# Patient Record
Sex: MALE | Race: BLACK OR AFRICAN AMERICAN | NOT HISPANIC OR LATINO
[De-identification: names, ages, dates, MRNs, and addresses within clinical notes are randomized per-mention and may not be internally consistent; named-entity substitution may affect disease eponyms.]

---

## 2017-01-19 PROBLEM — Z00.00 ENCOUNTER FOR PREVENTIVE HEALTH EXAMINATION: Status: ACTIVE | Noted: 2017-01-19

## 2017-01-20 ENCOUNTER — APPOINTMENT (OUTPATIENT)
Dept: OTOLARYNGOLOGY | Facility: CLINIC | Age: 22
End: 2017-01-20

## 2017-01-20 VITALS
SYSTOLIC BLOOD PRESSURE: 116 MMHG | DIASTOLIC BLOOD PRESSURE: 65 MMHG | OXYGEN SATURATION: 16 % | WEIGHT: 155 LBS | BODY MASS INDEX: 20.99 KG/M2 | HEART RATE: 100 BPM | HEIGHT: 72 IN

## 2017-01-20 DIAGNOSIS — Z83.3 FAMILY HISTORY OF DIABETES MELLITUS: ICD-10-CM

## 2017-01-20 DIAGNOSIS — Z80.3 FAMILY HISTORY OF MALIGNANT NEOPLASM OF BREAST: ICD-10-CM

## 2017-01-20 DIAGNOSIS — Z82.49 FAMILY HISTORY OF ISCHEMIC HEART DISEASE AND OTHER DISEASES OF THE CIRCULATORY SYSTEM: ICD-10-CM

## 2017-01-20 DIAGNOSIS — Z80.9 FAMILY HISTORY OF MALIGNANT NEOPLASM, UNSPECIFIED: ICD-10-CM

## 2017-01-20 NOTE — PROCEDURE
[Topical Lidocaine] : topical lidocaine [Oxymetazoline HCl] : oxymetazoline HCl [Flexible Endoscope] : examined with the flexible endoscope [Serial Number: ___] : Serial Number: [unfilled] [Mass ___ cm] : [unfilled]Ucm mass [Normal] : the false vocal folds were pink and regular, the ventricular sulcus was open, the true vocal folds were glistening white, tense and of equal length, mobility, and height [de-identified] : there is enlarges adenoid w some overlying irritation [de-identified] : adenopathy [FreeTextEntry3] : enlarged adenoid w overlying irregularity

## 2017-01-20 NOTE — HISTORY OF PRESENT ILLNESS
[Neck Mass] : neck mass [de-identified] : Patient noted a lump on his neck about 2-3 months.  He went to [facility] [place] of which they did a sonogram and noted he had lyphadenopathy.  Her PCP referred patient to a Hematologist who referred him to Dr. Wesley.  Patient had a sonogram in Dr. Wesley's office and was told that he may have lymphoma.  Patient was referred to us.  No biopsies were performed.  first noted bu pt sever mo ago. no fecver, nt sweats or wt loss. Pt had CT at [facility]  [Difficulty Swallowing] : no difficulty swallowing [Painful Swallowing] : no painful swallowing

## 2017-01-20 NOTE — PHYSICAL EXAM
[Nodule] : nodule [Midline] : trachea located in midline position [Normal] : no rashes [FreeTextEntry1] : pt w bilat mobile post trianle, upper jug and submax nodes

## 2017-01-20 NOTE — CONSULT LETTER
[Dear  ___] : Dear  [unfilled], [Consult Letter:] : I had the pleasure of evaluating your patient, [unfilled]. [Please see my note below.] : Please see my note below. [Consult Closing:] : Thank you very much for allowing me to participate in the care of this patient.  If you have any questions, please do not hesitate to contact me. [Sincerely,] : Sincerely, [Jose Reis MD] : Jose Reis MD [FreeTextEntry2] : Art Wesley MD ([place], [place])

## 2017-01-25 ENCOUNTER — FORM ENCOUNTER (OUTPATIENT)
Age: 22
End: 2017-01-25

## 2017-01-25 ENCOUNTER — RESULT REVIEW (OUTPATIENT)
Age: 22
End: 2017-01-25

## 2017-01-26 ENCOUNTER — OUTPATIENT (OUTPATIENT)
Dept: OUTPATIENT SERVICES | Facility: HOSPITAL | Age: 22
LOS: 1 days | End: 2017-01-26
Payer: COMMERCIAL

## 2017-01-26 ENCOUNTER — APPOINTMENT (OUTPATIENT)
Dept: ULTRASOUND IMAGING | Facility: IMAGING CENTER | Age: 22
End: 2017-01-26

## 2017-01-26 DIAGNOSIS — R59.0 LOCALIZED ENLARGED LYMPH NODES: ICD-10-CM

## 2017-01-26 PROCEDURE — 20206 BIOPSY MUSCLE PERQ NEEDLE: CPT

## 2017-01-26 PROCEDURE — 88185 FLOWCYTOMETRY/TC ADD-ON: CPT

## 2017-01-26 PROCEDURE — 88184 FLOWCYTOMETRY/ TC 1 MARKER: CPT

## 2017-01-26 PROCEDURE — 88342 IMHCHEM/IMCYTCHM 1ST ANTB: CPT

## 2017-01-26 PROCEDURE — 76942 ECHO GUIDE FOR BIOPSY: CPT

## 2017-01-26 PROCEDURE — 88360 TUMOR IMMUNOHISTOCHEM/MANUAL: CPT

## 2017-01-26 PROCEDURE — 88365 INSITU HYBRIDIZATION (FISH): CPT

## 2017-01-26 PROCEDURE — 88341 IMHCHEM/IMCYTCHM EA ADD ANTB: CPT

## 2017-01-26 PROCEDURE — 88172 CYTP DX EVAL FNA 1ST EA SITE: CPT

## 2017-01-26 PROCEDURE — 88173 CYTOPATH EVAL FNA REPORT: CPT

## 2017-01-26 PROCEDURE — 88305 TISSUE EXAM BY PATHOLOGIST: CPT

## 2017-02-02 LAB — TM INTERPRETATION: SIGNIFICANT CHANGE UP

## 2017-02-03 ENCOUNTER — OUTPATIENT (OUTPATIENT)
Dept: OUTPATIENT SERVICES | Facility: HOSPITAL | Age: 22
LOS: 1 days | End: 2017-02-03

## 2017-02-03 VITALS
WEIGHT: 158.95 LBS | DIASTOLIC BLOOD PRESSURE: 74 MMHG | HEIGHT: 71.5 IN | RESPIRATION RATE: 16 BRPM | TEMPERATURE: 97 F | HEART RATE: 68 BPM | SYSTOLIC BLOOD PRESSURE: 126 MMHG

## 2017-02-03 DIAGNOSIS — Z98.890 OTHER SPECIFIED POSTPROCEDURAL STATES: Chronic | ICD-10-CM

## 2017-02-03 DIAGNOSIS — R59.0 LOCALIZED ENLARGED LYMPH NODES: ICD-10-CM

## 2017-02-03 LAB
HCT VFR BLD CALC: 34.6 % — LOW (ref 39–50)
HGB BLD-MCNC: 11.2 G/DL — LOW (ref 13–17)
MCHC RBC-ENTMCNC: 27.9 PG — SIGNIFICANT CHANGE UP (ref 27–34)
MCHC RBC-ENTMCNC: 32.4 % — SIGNIFICANT CHANGE UP (ref 32–36)
MCV RBC AUTO: 86.3 FL — SIGNIFICANT CHANGE UP (ref 80–100)
PLATELET # BLD AUTO: 218 K/UL — SIGNIFICANT CHANGE UP (ref 150–400)
PMV BLD: 10.5 FL — SIGNIFICANT CHANGE UP (ref 7–13)
RBC # BLD: 4.01 M/UL — LOW (ref 4.2–5.8)
RBC # FLD: 14 % — SIGNIFICANT CHANGE UP (ref 10.3–14.5)
WBC # BLD: 7.79 K/UL — SIGNIFICANT CHANGE UP (ref 3.8–10.5)
WBC # FLD AUTO: 7.79 K/UL — SIGNIFICANT CHANGE UP (ref 3.8–10.5)

## 2017-02-03 RX ORDER — SODIUM CHLORIDE 9 MG/ML
1000 INJECTION, SOLUTION INTRAVENOUS
Qty: 0 | Refills: 0 | Status: DISCONTINUED | OUTPATIENT
Start: 2017-02-08 | End: 2017-02-23

## 2017-02-03 RX ORDER — SODIUM CHLORIDE 9 MG/ML
3 INJECTION INTRAMUSCULAR; INTRAVENOUS; SUBCUTANEOUS EVERY 8 HOURS
Qty: 0 | Refills: 0 | Status: DISCONTINUED | OUTPATIENT
Start: 2017-02-08 | End: 2017-02-23

## 2017-02-03 NOTE — H&P PST ADULT - NEGATIVE OPHTHALMOLOGIC SYMPTOMS
no lacrimation R/no discharge R/no blurred vision R/no loss of vision L/no pain R/no pain L/no photophobia/no blurred vision L/no diplopia/no scleral injection L/no irritation R/no irritation L/no lacrimation L/no discharge L/no loss of vision R

## 2017-02-03 NOTE — H&P PST ADULT - RS GEN PE MLT RESP DETAILS PC
breath sounds equal/no intercostal retractions/respirations non-labored/good air movement/no rales/no chest wall tenderness/airway patent/no subcutaneous emphysema/clear to auscultation bilaterally/no rhonchi/no wheezes

## 2017-02-03 NOTE — H&P PST ADULT - PROBLEM SELECTOR PLAN 1
Scheduled for bilateral lymph node biopsy on 02/08/17. Pre op instructions, famotidine, chlorhexidine gluconate soap given and explained. Pt verbalized understanding.

## 2017-02-03 NOTE — H&P PST ADULT - NECK DETAILS
no JVD/palpable enlarged mass on both sides of neck/supple/normal thyroid gland palpable enlarged masses on both sides of neck/no JVD/supple/normal thyroid gland

## 2017-02-03 NOTE — H&P PST ADULT - NSANTHOSAYNRD_GEN_A_CORE
No. SAHARA screening performed.  STOP BANG Legend: 0-2 = LOW Risk; 3-4 = INTERMEDIATE Risk; 5-8 = HIGH Risk

## 2017-02-03 NOTE — H&P PST ADULT - HISTORY OF PRESENT ILLNESS
x3-4 months 22 y/o Black male presents to PST for pre op evaluation with hx of multiple neck masses x3-4 months. Pt is now scheduled for bilateral lymph node biopsy on 02/08/17

## 2017-02-03 NOTE — H&P PST ADULT - NEGATIVE ENMT SYMPTOMS
no vertigo/no nasal discharge/no nasal obstruction/no sinus symptoms/no post-nasal discharge/no throat pain/no gum bleeding/no recurrent cold sores/no dry mouth/no dysphagia/no nose bleeds/no ear pain/no nasal congestion/no abnormal taste sensation no hearing difficulty/no throat pain/no sinus symptoms/no dysphagia/no dry mouth/no recurrent cold sores/no post-nasal discharge/no nose bleeds/no abnormal taste sensation/no ear pain/no nasal congestion/no vertigo/no nasal obstruction/no nasal discharge/no gum bleeding

## 2017-02-03 NOTE — H&P PST ADULT - NEGATIVE CARDIOVASCULAR SYMPTOMS
no chest pain/no claudication/no paroxysmal nocturnal dyspnea/no palpitations/no orthopnea/no peripheral edema/no dyspnea on exertion

## 2017-02-03 NOTE — H&P PST ADULT - NEGATIVE NEUROLOGICAL SYMPTOMS
no loss of sensation/no difficulty walking/no tremors/no vertigo/no paresthesias/no syncope/no generalized seizures/no focal seizures/no transient paralysis/no weakness

## 2017-02-07 ENCOUNTER — RESULT REVIEW (OUTPATIENT)
Age: 22
End: 2017-02-07

## 2017-02-07 NOTE — ASU PATIENT PROFILE, ADULT - TEACHING/LEARNING LEARNING PREFERENCES
skill demonstration/pictorial/video/verbal instruction/group instruction/audio/individual instruction/computer/internet/written material

## 2017-02-08 ENCOUNTER — OUTPATIENT (OUTPATIENT)
Dept: OUTPATIENT SERVICES | Facility: HOSPITAL | Age: 22
LOS: 1 days | End: 2017-02-08
Payer: COMMERCIAL

## 2017-02-08 ENCOUNTER — OUTPATIENT (OUTPATIENT)
Dept: OUTPATIENT SERVICES | Facility: HOSPITAL | Age: 22
LOS: 1 days | Discharge: HOME | End: 2017-02-08
Payer: MEDICAID

## 2017-02-08 ENCOUNTER — APPOINTMENT (OUTPATIENT)
Dept: OTOLARYNGOLOGY | Facility: HOSPITAL | Age: 22
End: 2017-02-08

## 2017-02-08 VITALS
SYSTOLIC BLOOD PRESSURE: 100 MMHG | RESPIRATION RATE: 16 BRPM | HEART RATE: 79 BPM | DIASTOLIC BLOOD PRESSURE: 60 MMHG | OXYGEN SATURATION: 97 %

## 2017-02-08 VITALS
WEIGHT: 158.95 LBS | RESPIRATION RATE: 18 BRPM | DIASTOLIC BLOOD PRESSURE: 59 MMHG | HEIGHT: 71.5 IN | SYSTOLIC BLOOD PRESSURE: 119 MMHG | TEMPERATURE: 99 F | HEART RATE: 100 BPM | OXYGEN SATURATION: 100 %

## 2017-02-08 DIAGNOSIS — Z98.890 OTHER SPECIFIED POSTPROCEDURAL STATES: Chronic | ICD-10-CM

## 2017-02-08 DIAGNOSIS — R59.0 LOCALIZED ENLARGED LYMPH NODES: ICD-10-CM

## 2017-02-08 PROCEDURE — 88189 FLOWCYTOMETRY/READ 16 & >: CPT

## 2017-02-08 PROCEDURE — 88291 CYTO/MOLECULAR REPORT: CPT

## 2017-02-08 PROCEDURE — 88280 CHROMOSOME KARYOTYPE STUDY: CPT

## 2017-02-08 PROCEDURE — 88360 TUMOR IMMUNOHISTOCHEM/MANUAL: CPT | Mod: 26

## 2017-02-08 PROCEDURE — 88331 PATH CONSLTJ SURG 1 BLK 1SPC: CPT | Mod: 26

## 2017-02-08 PROCEDURE — 88365 INSITU HYBRIDIZATION (FISH): CPT | Mod: 26,59

## 2017-02-08 PROCEDURE — 88237 TISSUE CULTURE BONE MARROW: CPT

## 2017-02-08 PROCEDURE — 88341 IMHCHEM/IMCYTCHM EA ADD ANTB: CPT | Mod: 26,59

## 2017-02-08 PROCEDURE — 38510 BIOPSY/REMOVAL LYMPH NODES: CPT | Mod: GC

## 2017-02-08 PROCEDURE — 88342 IMHCHEM/IMCYTCHM 1ST ANTB: CPT | Mod: 26,59

## 2017-02-08 PROCEDURE — 88307 TISSUE EXAM BY PATHOLOGIST: CPT | Mod: 26

## 2017-02-08 PROCEDURE — 88264 CHROMOSOME ANALYSIS 20-25: CPT

## 2017-02-08 PROCEDURE — 88367 INSITU HYBRIDIZATION AUTO: CPT | Mod: 26

## 2017-02-08 PROCEDURE — G0452: CPT | Mod: 26

## 2017-02-08 PROCEDURE — 88364 INSITU HYBRIDIZATION (FISH): CPT | Mod: 26

## 2017-02-08 PROCEDURE — 88334 PATH CONSLTJ SURG CYTO XM EA: CPT | Mod: 26,59

## 2017-02-08 RX ORDER — SODIUM CHLORIDE 9 MG/ML
1000 INJECTION, SOLUTION INTRAVENOUS
Qty: 0 | Refills: 0 | Status: DISCONTINUED | OUTPATIENT
Start: 2017-02-08 | End: 2017-02-23

## 2017-02-08 RX ORDER — OXYCODONE HYDROCHLORIDE 5 MG/1
2 TABLET ORAL
Qty: 30 | Refills: 0
Start: 2017-02-08

## 2017-02-08 RX ORDER — ONDANSETRON 8 MG/1
4 TABLET, FILM COATED ORAL
Qty: 0 | Refills: 0 | Status: DISCONTINUED | OUTPATIENT
Start: 2017-02-08 | End: 2017-02-08

## 2017-02-08 RX ORDER — FENTANYL CITRATE 50 UG/ML
50 INJECTION INTRAVENOUS
Qty: 0 | Refills: 0 | Status: DISCONTINUED | OUTPATIENT
Start: 2017-02-08 | End: 2017-02-08

## 2017-02-08 RX ADMIN — SODIUM CHLORIDE 30 MILLILITER(S): 9 INJECTION, SOLUTION INTRAVENOUS at 15:02

## 2017-02-08 NOTE — ASU DISCHARGE PLAN (ADULT/PEDIATRIC). - MEDICATION SUMMARY - MEDICATIONS TO TAKE
I will START or STAY ON the medications listed below when I get home from the hospital:    acetaminophen-oxyCODONE 325 mg-5 mg oral tablet  -- 2 tab(s) by mouth every 6 hours, As needed, Moderate Pain (4 - 6) -for severe pain MDD:8  -- Indication: For Localized enlarged lymph nodes

## 2017-02-08 NOTE — ASU DISCHARGE PLAN (ADULT/PEDIATRIC). - NOTIFY
Fever greater than 101/Bleeding that does not stop/Unable to Urinate/Increased Irritability or Sluggishness/Pain not relieved by Medications/Excessive Diarrhea/Inability to Tolerate Liquids or Foods/Swelling that continues/Persistent Nausea and Vomiting

## 2017-02-08 NOTE — ASU DISCHARGE PLAN (ADULT/PEDIATRIC). - NURSING INSTRUCTIONS
Cool and warm liquids that are not irritating to the throat should be given for the first day or two. Avoid hot liquids. Avoid citrus juices and milk. Advance at your own pace starting with soft foods and advancing to a regular diet. Avoid rough and scratchy foods and foods that are difficult to chew for approximately 5 days. Avoid strenuous exercise and blowing of nose. Cool and warm liquids that are not irritating to the throat should be given for the first day or two. Avoid hot liquids. Avoid citrus juices and milk. Advance at your own pace starting with soft foods and advancing to a regular diet. Avoid rough and scratchy foods and foods that are difficult to chew for approximately 5 days. Avoid strenuous exercise and blowing of nose. Do not take pain medication on an empty stomach.  Increase fluids and fiber in diet to prevent constipation. Percocet Information Sheet Provided You were given IV Tylenol for pain management.  Please DO NOT take tylenol for the next 6-8 hours (until 10pm ). Please do not exceed 3000mg in 24hours.

## 2017-02-09 ENCOUNTER — TRANSCRIPTION ENCOUNTER (OUTPATIENT)
Age: 22
End: 2017-02-09

## 2017-02-09 DIAGNOSIS — C85.90 NON-HODGKIN LYMPHOMA, UNSPECIFIED, UNSPECIFIED SITE: ICD-10-CM

## 2017-02-09 LAB — TM INTERPRETATION: SIGNIFICANT CHANGE UP

## 2017-02-16 ENCOUNTER — APPOINTMENT (OUTPATIENT)
Dept: OTOLARYNGOLOGY | Facility: CLINIC | Age: 22
End: 2017-02-16

## 2017-02-17 LAB — HEMATOPATHOLOGY REPORT: SIGNIFICANT CHANGE UP

## 2017-02-17 PROCEDURE — G0452: CPT | Mod: 26

## 2017-02-20 ENCOUNTER — OTHER (OUTPATIENT)
Age: 22
End: 2017-02-20

## 2017-02-23 ENCOUNTER — APPOINTMENT (OUTPATIENT)
Dept: OTOLARYNGOLOGY | Facility: CLINIC | Age: 22
End: 2017-02-23

## 2017-02-23 DIAGNOSIS — R59.0 LOCALIZED ENLARGED LYMPH NODES: ICD-10-CM

## 2017-02-23 DIAGNOSIS — R22.0 LOCALIZED SWELLING, MASS AND LUMP, HEAD: ICD-10-CM

## 2017-02-23 DIAGNOSIS — R22.1 LOCALIZED SWELLING, MASS AND LUMP, HEAD: ICD-10-CM

## 2017-02-23 LAB
DNA PLOIDY SPEC FC-IMP: SIGNIFICANT CHANGE UP
DNA PLOIDY SPEC FC-IMP: SIGNIFICANT CHANGE UP

## 2017-02-23 RX ORDER — OXYCODONE AND ACETAMINOPHEN 5; 325 MG/1; MG/1
5-325 TABLET ORAL
Qty: 30 | Refills: 0 | Status: ACTIVE | COMMUNITY
Start: 2017-02-08

## 2017-02-27 LAB — CHROM ANALY OVERALL INTERP SPEC-IMP: SIGNIFICANT CHANGE UP

## 2017-02-28 ENCOUNTER — APPOINTMENT (OUTPATIENT)
Dept: INFECTIOUS DISEASE | Facility: CLINIC | Age: 22
End: 2017-02-28

## 2019-08-20 PROBLEM — R59.0 LOCALIZED ENLARGED LYMPH NODES: Chronic | Status: ACTIVE | Noted: 2017-02-03

## 2019-08-29 ENCOUNTER — APPOINTMENT (OUTPATIENT)
Dept: OPHTHALMOLOGY | Facility: CLINIC | Age: 24
End: 2019-08-29

## 2021-04-08 NOTE — ASU DISCHARGE PLAN (ADULT/PEDIATRIC). - MEDICATION SUMMARY - MEDICATIONS TO CHANGE
Addended by: JOSE MACEDO on: 4/7/2021 10:49 PM     Modules accepted: Orders    
Addended by: LOPEZ LEDESMA on: 4/6/2021 04:23 PM     Modules accepted: Orders    
I will SWITCH the dose or number of times a day I take the medications listed below when I get home from the hospital:  None

## 2022-08-24 ENCOUNTER — EMERGENCY (EMERGENCY)
Facility: HOSPITAL | Age: 27
LOS: 1 days | Discharge: LEFT BEFORE DISCHARGE | End: 2022-08-24
Attending: EMERGENCY MEDICINE | Admitting: EMERGENCY MEDICINE
Payer: COMMERCIAL

## 2022-08-24 VITALS
SYSTOLIC BLOOD PRESSURE: 128 MMHG | WEIGHT: 175.05 LBS | HEIGHT: 71.5 IN | DIASTOLIC BLOOD PRESSURE: 79 MMHG | HEART RATE: 131 BPM | RESPIRATION RATE: 20 BRPM | OXYGEN SATURATION: 98 % | TEMPERATURE: 103 F

## 2022-08-24 DIAGNOSIS — Z98.890 OTHER SPECIFIED POSTPROCEDURAL STATES: Chronic | ICD-10-CM

## 2022-08-24 LAB
ALBUMIN SERPL ELPH-MCNC: 2.3 G/DL — LOW (ref 3.3–5)
ALP SERPL-CCNC: 132 U/L — HIGH (ref 40–120)
ALT FLD-CCNC: 20 U/L — SIGNIFICANT CHANGE UP (ref 10–45)
ANION GAP SERPL CALC-SCNC: 9 MMOL/L — SIGNIFICANT CHANGE UP (ref 5–17)
APPEARANCE UR: CLEAR — SIGNIFICANT CHANGE UP
APTT BLD: 34 SEC — SIGNIFICANT CHANGE UP (ref 27.5–35.5)
AST SERPL-CCNC: 32 U/L — SIGNIFICANT CHANGE UP (ref 10–40)
BASOPHILS # BLD AUTO: 0.02 K/UL — SIGNIFICANT CHANGE UP (ref 0–0.2)
BASOPHILS NFR BLD AUTO: 0.2 % — SIGNIFICANT CHANGE UP (ref 0–2)
BILIRUB SERPL-MCNC: 0.3 MG/DL — SIGNIFICANT CHANGE UP (ref 0.2–1.2)
BILIRUB UR-MCNC: NEGATIVE — SIGNIFICANT CHANGE UP
BUN SERPL-MCNC: 13 MG/DL — SIGNIFICANT CHANGE UP (ref 7–23)
CALCIUM SERPL-MCNC: 8.5 MG/DL — SIGNIFICANT CHANGE UP (ref 8.4–10.5)
CHLORIDE SERPL-SCNC: 95 MMOL/L — LOW (ref 96–108)
CO2 SERPL-SCNC: 24 MMOL/L — SIGNIFICANT CHANGE UP (ref 22–31)
COLOR SPEC: YELLOW — SIGNIFICANT CHANGE UP
CREAT SERPL-MCNC: 1.29 MG/DL — SIGNIFICANT CHANGE UP (ref 0.5–1.3)
DIFF PNL FLD: ABNORMAL
EGFR: 78 ML/MIN/1.73M2 — SIGNIFICANT CHANGE UP
EOSINOPHIL # BLD AUTO: 0 K/UL — SIGNIFICANT CHANGE UP (ref 0–0.5)
EOSINOPHIL NFR BLD AUTO: 0 % — SIGNIFICANT CHANGE UP (ref 0–6)
GLUCOSE SERPL-MCNC: 105 MG/DL — HIGH (ref 70–99)
GLUCOSE UR QL: NEGATIVE — SIGNIFICANT CHANGE UP
HCT VFR BLD CALC: 33.3 % — LOW (ref 39–50)
HGB BLD-MCNC: 10.8 G/DL — LOW (ref 13–17)
IMM GRANULOCYTES NFR BLD AUTO: 1.6 % — HIGH (ref 0–1.5)
INR BLD: 1.5 RATIO — HIGH (ref 0.88–1.16)
KETONES UR-MCNC: NEGATIVE — SIGNIFICANT CHANGE UP
LACTATE SERPL-SCNC: 1.2 MMOL/L — SIGNIFICANT CHANGE UP (ref 0.7–2)
LEUKOCYTE ESTERASE UR-ACNC: NEGATIVE — SIGNIFICANT CHANGE UP
LYMPHOCYTES # BLD AUTO: 5.53 K/UL — HIGH (ref 1–3.3)
LYMPHOCYTES # BLD AUTO: 51.2 % — HIGH (ref 13–44)
MCHC RBC-ENTMCNC: 27.6 PG — SIGNIFICANT CHANGE UP (ref 27–34)
MCHC RBC-ENTMCNC: 32.4 GM/DL — SIGNIFICANT CHANGE UP (ref 32–36)
MCV RBC AUTO: 84.9 FL — SIGNIFICANT CHANGE UP (ref 80–100)
MONOCYTES # BLD AUTO: 0.77 K/UL — SIGNIFICANT CHANGE UP (ref 0–0.9)
MONOCYTES NFR BLD AUTO: 7.1 % — SIGNIFICANT CHANGE UP (ref 2–14)
NEUTROPHILS # BLD AUTO: 4.32 K/UL — SIGNIFICANT CHANGE UP (ref 1.8–7.4)
NEUTROPHILS NFR BLD AUTO: 39.9 % — LOW (ref 43–77)
NITRITE UR-MCNC: NEGATIVE — SIGNIFICANT CHANGE UP
NRBC # BLD: 0 /100 WBCS — SIGNIFICANT CHANGE UP (ref 0–0)
NT-PROBNP SERPL-SCNC: 18 PG/ML — SIGNIFICANT CHANGE UP (ref 0–300)
PH UR: 6 — SIGNIFICANT CHANGE UP (ref 5–8)
PLATELET # BLD AUTO: 240 K/UL — SIGNIFICANT CHANGE UP (ref 150–400)
POTASSIUM SERPL-MCNC: 3.8 MMOL/L — SIGNIFICANT CHANGE UP (ref 3.5–5.3)
POTASSIUM SERPL-SCNC: 3.8 MMOL/L — SIGNIFICANT CHANGE UP (ref 3.5–5.3)
PROCALCITONIN SERPL-MCNC: 0.55 NG/ML — HIGH
PROT SERPL-MCNC: 11.5 G/DL — HIGH (ref 6–8.3)
PROT UR-MCNC: 500 MG/DL
PROTHROM AB SERPL-ACNC: 17.5 SEC — HIGH (ref 10.5–13.4)
RAPID RVP RESULT: SIGNIFICANT CHANGE UP
RBC # BLD: 3.92 M/UL — LOW (ref 4.2–5.8)
RBC # FLD: 13.8 % — SIGNIFICANT CHANGE UP (ref 10.3–14.5)
SARS-COV-2 RNA SPEC QL NAA+PROBE: SIGNIFICANT CHANGE UP
SODIUM SERPL-SCNC: 128 MMOL/L — LOW (ref 135–145)
SP GR SPEC: 1.02 — SIGNIFICANT CHANGE UP (ref 1.01–1.02)
TROPONIN I, HIGH SENSITIVITY RESULT: 6.3 NG/L — SIGNIFICANT CHANGE UP
TROPONIN I, HIGH SENSITIVITY RESULT: 8.7 NG/L — SIGNIFICANT CHANGE UP
UROBILINOGEN FLD QL: 1
WBC # BLD: 10.81 K/UL — HIGH (ref 3.8–10.5)
WBC # FLD AUTO: 10.81 K/UL — HIGH (ref 3.8–10.5)

## 2022-08-24 PROCEDURE — 96365 THER/PROPH/DIAG IV INF INIT: CPT

## 2022-08-24 PROCEDURE — 84145 PROCALCITONIN (PCT): CPT

## 2022-08-24 PROCEDURE — 71045 X-RAY EXAM CHEST 1 VIEW: CPT

## 2022-08-24 PROCEDURE — 99285 EMERGENCY DEPT VISIT HI MDM: CPT | Mod: 25

## 2022-08-24 PROCEDURE — 71045 X-RAY EXAM CHEST 1 VIEW: CPT | Mod: 26

## 2022-08-24 PROCEDURE — 93010 ELECTROCARDIOGRAM REPORT: CPT

## 2022-08-24 PROCEDURE — 84484 ASSAY OF TROPONIN QUANT: CPT

## 2022-08-24 PROCEDURE — 85025 COMPLETE CBC W/AUTO DIFF WBC: CPT

## 2022-08-24 PROCEDURE — 87040 BLOOD CULTURE FOR BACTERIA: CPT

## 2022-08-24 PROCEDURE — 96375 TX/PRO/DX INJ NEW DRUG ADDON: CPT

## 2022-08-24 PROCEDURE — 87086 URINE CULTURE/COLONY COUNT: CPT

## 2022-08-24 PROCEDURE — 0225U NFCT DS DNA&RNA 21 SARSCOV2: CPT

## 2022-08-24 PROCEDURE — 81001 URINALYSIS AUTO W/SCOPE: CPT

## 2022-08-24 PROCEDURE — 93005 ELECTROCARDIOGRAM TRACING: CPT

## 2022-08-24 PROCEDURE — 85730 THROMBOPLASTIN TIME PARTIAL: CPT

## 2022-08-24 PROCEDURE — 36415 COLL VENOUS BLD VENIPUNCTURE: CPT

## 2022-08-24 PROCEDURE — 99285 EMERGENCY DEPT VISIT HI MDM: CPT

## 2022-08-24 PROCEDURE — 83605 ASSAY OF LACTIC ACID: CPT

## 2022-08-24 PROCEDURE — 96361 HYDRATE IV INFUSION ADD-ON: CPT

## 2022-08-24 PROCEDURE — 85610 PROTHROMBIN TIME: CPT

## 2022-08-24 PROCEDURE — 80053 COMPREHEN METABOLIC PANEL: CPT

## 2022-08-24 PROCEDURE — 96368 THER/DIAG CONCURRENT INF: CPT

## 2022-08-24 PROCEDURE — 83880 ASSAY OF NATRIURETIC PEPTIDE: CPT

## 2022-08-24 RX ORDER — GENTAMICIN SULFATE 40 MG/ML
80 VIAL (ML) INJECTION ONCE
Refills: 0 | Status: COMPLETED | OUTPATIENT
Start: 2022-08-24 | End: 2022-08-24

## 2022-08-24 RX ORDER — KETOROLAC TROMETHAMINE 30 MG/ML
30 SYRINGE (ML) INJECTION ONCE
Refills: 0 | Status: DISCONTINUED | OUTPATIENT
Start: 2022-08-24 | End: 2022-08-24

## 2022-08-24 RX ORDER — SODIUM CHLORIDE 9 MG/ML
2500 INJECTION INTRAMUSCULAR; INTRAVENOUS; SUBCUTANEOUS ONCE
Refills: 0 | Status: COMPLETED | OUTPATIENT
Start: 2022-08-24 | End: 2022-08-24

## 2022-08-24 RX ORDER — ACETAMINOPHEN 500 MG
975 TABLET ORAL ONCE
Refills: 0 | Status: COMPLETED | OUTPATIENT
Start: 2022-08-24 | End: 2022-08-24

## 2022-08-24 RX ORDER — VANCOMYCIN HCL 1 G
1000 VIAL (EA) INTRAVENOUS ONCE
Refills: 0 | Status: COMPLETED | OUTPATIENT
Start: 2022-08-24 | End: 2022-08-24

## 2022-08-24 RX ADMIN — Medication 250 MILLIGRAM(S): at 22:34

## 2022-08-24 RX ADMIN — SODIUM CHLORIDE 2500 MILLILITER(S): 9 INJECTION INTRAMUSCULAR; INTRAVENOUS; SUBCUTANEOUS at 21:00

## 2022-08-24 RX ADMIN — Medication 975 MILLIGRAM(S): at 21:00

## 2022-08-24 RX ADMIN — Medication 80 MILLIGRAM(S): at 22:52

## 2022-08-24 RX ADMIN — SODIUM CHLORIDE 2500 MILLILITER(S): 9 INJECTION INTRAMUSCULAR; INTRAVENOUS; SUBCUTANEOUS at 19:59

## 2022-08-24 RX ADMIN — Medication 104 MILLIGRAM(S): at 22:34

## 2022-08-24 RX ADMIN — Medication 975 MILLIGRAM(S): at 19:59

## 2022-08-24 RX ADMIN — Medication 30 MILLIGRAM(S): at 20:34

## 2022-08-24 RX ADMIN — Medication 30 MILLIGRAM(S): at 20:04

## 2022-08-24 NOTE — ED ADULT NURSE NOTE - NS ED NURSE DISCH DISPOSITION
99.2
AMA (saw a physician/midlevel provider and clinician was able to provide reasons for staying for treatment & form is signed)

## 2022-08-24 NOTE — ED PROVIDER NOTE - CLINICAL SUMMARY MEDICAL DECISION MAKING FREE TEXT BOX
pt with chest pain x 1 week, mild headache, noted to have fever 104+ , tachycardic. no other URI sxs. otherwise well appearing, nontoxic.  sepsis w/u initiated. r/o covid, flu, pericarditis/myocarditis. consider endocarditis as pt had dental work few weeks ago.  give iv fluids, tylenol, toradol for pain. reassess    update: pt with minimal leukocytosis, elevated procalcitonin. normal trops. ekg only sinus tach. cxr neg. rvp/covid neg. vitals/sxs improved. I recommended pt to be admitted for further w/u and to r/o endocarditis. pt lives in [place] and wants to be admitted at hospital there. I explained to pt that he should be admitted here for most logistical sense , and that transfer process may be difficult (lack of beds at other facility).  pt wants to leave AMA and go to other hospital himself. I offered to attempt to arrange transfer to other hospital of his preference. pt declining. I d/w pt r/b/a of his sxs and condition and potential for dangerous/deadly condition including endocarditis, sepsis, heart damage, and benefits of hospitalization, treatment, testing. pt understands and declines admission here and declining transfer. he states he will go himself to ED in [place]. I specifically advised him not to delay or wait to see pmd in office. pt a&ox3, coherent, has capacity. signed out ama

## 2022-08-24 NOTE — ED PROVIDER NOTE - OBJECTIVE STATEMENT
pt c/o mid chest pain, sharp, moderate, for last 1 week, worse today, asssoc c mild headaches that improves with tylenol. no noted fever/chills. no cough, sorethroat, rhinorrhea. no  rash. no n/v/d. no sob. had moderna covid vaccine x 2. had L upper molar dental work ~1 month ago.

## 2022-08-24 NOTE — ED PROVIDER NOTE - NSFOLLOWUPINSTRUCTIONS_ED_ALL_ED_FT
- you are leaving against medical advice. we recommend that you be admitted here for further treatment and testing. you understand that your symptoms could be due to something dangerous and deadly which could include , but is not limited to, a heart infection, heart damage, sepsis or a severe infection, which could lead to death, permanent disability, stroke, paralysis, permanent heart damage.  you understand the benefits of hospitalization and treatment.  you preferred to go to a hospital in [place] , for which we offered to arrange a transfer, but you are declining.     - you should go directly to the ER of [place] or whichever hospital you preferred to be admitted to. we do not recommend delaying hospitalization or just waiting to see your doctor in the office.    - return if you change your mind or for worse chest pain, difficult breathing, feeling faint or other concerns

## 2022-08-24 NOTE — ED ADULT TRIAGE NOTE - CHIEF COMPLAINT QUOTE
c/o chest pain/tightness, SOB and fevers intermittently x 1 week. pt reports pain worsens on exertion. pt with oral temp 102.7 on arrival to ED. pt with O2 sat 99% RA on arrival to ED.

## 2022-08-24 NOTE — ED ADULT NURSE NOTE - OBJECTIVE STATEMENT
Patient presents to ED complaining of chest pain for 7 days, toothache, headache. Pt states pain substernal radiating down left arm.

## 2022-08-24 NOTE — ED PROVIDER NOTE - PATIENT PORTAL LINK FT
You can access the FollowMyHealth Patient Portal offered by [facility] by registering at the following website: [email or web address]. By joining [facility]’s FollowMyHealth portal, you will also be able to view your health information using other applications (apps) compatible with our system.

## 2022-08-25 VITALS
SYSTOLIC BLOOD PRESSURE: 116 MMHG | OXYGEN SATURATION: 100 % | DIASTOLIC BLOOD PRESSURE: 62 MMHG | TEMPERATURE: 99 F | RESPIRATION RATE: 16 BRPM | HEART RATE: 96 BPM

## 2022-08-25 LAB
CULTURE RESULTS: SIGNIFICANT CHANGE UP
SPECIMEN SOURCE: SIGNIFICANT CHANGE UP

## 2022-08-25 RX ADMIN — Medication 1000 MILLIGRAM(S): at 00:00

## 2022-08-30 LAB
CULTURE RESULTS: SIGNIFICANT CHANGE UP
SPECIMEN SOURCE: SIGNIFICANT CHANGE UP

## 2023-08-29 NOTE — H&P PST ADULT - VENOUS THROMBOEMBOLISM BMI
Refill request for levothyroxine.  Last seen 3/2/23;  Last filled 3/2/23.  Refilled per protocol.       30 or less

## 2024-03-11 ENCOUNTER — INPATIENT (INPATIENT)
Facility: HOSPITAL | Age: 29
LOS: 1 days | Discharge: HOME | DRG: 974 | End: 2024-03-13
Attending: STUDENT IN AN ORGANIZED HEALTH CARE EDUCATION/TRAINING PROGRAM | Admitting: HOSPITALIST
Payer: COMMERCIAL

## 2024-03-11 VITALS
WEIGHT: 164.91 LBS | DIASTOLIC BLOOD PRESSURE: 70 MMHG | TEMPERATURE: 100 F | HEART RATE: 135 BPM | SYSTOLIC BLOOD PRESSURE: 111 MMHG | HEIGHT: 72 IN | RESPIRATION RATE: 18 BRPM | OXYGEN SATURATION: 85 %

## 2024-03-11 DIAGNOSIS — Z98.890 OTHER SPECIFIED POSTPROCEDURAL STATES: Chronic | ICD-10-CM

## 2024-03-11 DIAGNOSIS — J18.9 PNEUMONIA, UNSPECIFIED ORGANISM: ICD-10-CM

## 2024-03-11 LAB
ALBUMIN SERPL ELPH-MCNC: 1.8 G/DL — LOW (ref 3.3–5)
ALP SERPL-CCNC: 100 U/L — SIGNIFICANT CHANGE UP (ref 40–120)
ALT FLD-CCNC: 31 U/L — SIGNIFICANT CHANGE UP (ref 10–45)
ANION GAP SERPL CALC-SCNC: 11 MMOL/L — SIGNIFICANT CHANGE UP (ref 5–17)
AST SERPL-CCNC: 61 U/L — HIGH (ref 10–40)
BASE EXCESS BLDV CALC-SCNC: 1.6 MMOL/L — SIGNIFICANT CHANGE UP (ref -2–3)
BASOPHILS # BLD AUTO: 0 K/UL — SIGNIFICANT CHANGE UP (ref 0–0.2)
BASOPHILS NFR BLD AUTO: 0 % — SIGNIFICANT CHANGE UP (ref 0–2)
BILIRUB SERPL-MCNC: 0.4 MG/DL — SIGNIFICANT CHANGE UP (ref 0.2–1.2)
BUN SERPL-MCNC: 15 MG/DL — SIGNIFICANT CHANGE UP (ref 7–23)
CALCIUM SERPL-MCNC: 9 MG/DL — SIGNIFICANT CHANGE UP (ref 8.4–10.5)
CHLORIDE SERPL-SCNC: 94 MMOL/L — LOW (ref 96–108)
CO2 BLDV-SCNC: 27 MMOL/L — HIGH (ref 22–26)
CO2 SERPL-SCNC: 25 MMOL/L — SIGNIFICANT CHANGE UP (ref 22–31)
CREAT SERPL-MCNC: 1.2 MG/DL — SIGNIFICANT CHANGE UP (ref 0.5–1.3)
EGFR: 85 ML/MIN/1.73M2 — SIGNIFICANT CHANGE UP
EOSINOPHIL # BLD AUTO: 0 K/UL — SIGNIFICANT CHANGE UP (ref 0–0.5)
EOSINOPHIL NFR BLD AUTO: 0 % — SIGNIFICANT CHANGE UP (ref 0–6)
FLUAV AG NPH QL: SIGNIFICANT CHANGE UP
FLUBV AG NPH QL: SIGNIFICANT CHANGE UP
GAS PNL BLDV: SIGNIFICANT CHANGE UP
GLUCOSE SERPL-MCNC: 105 MG/DL — HIGH (ref 70–99)
HCO3 BLDV-SCNC: 26 MMOL/L — SIGNIFICANT CHANGE UP (ref 22–29)
HCT VFR BLD CALC: 29.6 % — LOW (ref 39–50)
HGB BLD-MCNC: 10 G/DL — LOW (ref 13–17)
LACTATE SERPL-SCNC: 1.2 MMOL/L — SIGNIFICANT CHANGE UP (ref 0.7–2)
LYMPHOCYTES # BLD AUTO: 25 % — SIGNIFICANT CHANGE UP (ref 13–44)
LYMPHOCYTES # BLD AUTO: 3.14 K/UL — SIGNIFICANT CHANGE UP (ref 1–3.3)
MCHC RBC-ENTMCNC: 28.2 PG — SIGNIFICANT CHANGE UP (ref 27–34)
MCHC RBC-ENTMCNC: 33.8 GM/DL — SIGNIFICANT CHANGE UP (ref 32–36)
MCV RBC AUTO: 83.6 FL — SIGNIFICANT CHANGE UP (ref 80–100)
MONOCYTES # BLD AUTO: 1.26 K/UL — HIGH (ref 0–0.9)
MONOCYTES NFR BLD AUTO: 10 % — SIGNIFICANT CHANGE UP (ref 2–14)
NEUTROPHILS # BLD AUTO: 8.17 K/UL — HIGH (ref 1.8–7.4)
NEUTROPHILS NFR BLD AUTO: 62 % — SIGNIFICANT CHANGE UP (ref 43–77)
NEUTS BAND # BLD: 3 % — SIGNIFICANT CHANGE UP (ref 0–8)
NRBC # BLD: 0 /100 WBCS — SIGNIFICANT CHANGE UP (ref 0–0)
NT-PROBNP SERPL-SCNC: 35 PG/ML — SIGNIFICANT CHANGE UP (ref 0–300)
PCO2 BLDV: 38 MMHG — LOW (ref 42–55)
PH BLDV: 7.44 — HIGH (ref 7.32–7.43)
PLAT MORPH BLD: NORMAL — SIGNIFICANT CHANGE UP
PLATELET # BLD AUTO: 375 K/UL — SIGNIFICANT CHANGE UP (ref 150–400)
PO2 BLDV: <35 MMHG — SIGNIFICANT CHANGE UP (ref 25–45)
POTASSIUM SERPL-MCNC: 5 MMOL/L — SIGNIFICANT CHANGE UP (ref 3.5–5.3)
POTASSIUM SERPL-SCNC: 5 MMOL/L — SIGNIFICANT CHANGE UP (ref 3.5–5.3)
PROT SERPL-MCNC: 9.5 G/DL — HIGH (ref 6–8.3)
RBC # BLD: 3.54 M/UL — LOW (ref 4.2–5.8)
RBC # FLD: 14.3 % — SIGNIFICANT CHANGE UP (ref 10.3–14.5)
RBC BLD AUTO: SIGNIFICANT CHANGE UP
RSV RNA NPH QL NAA+NON-PROBE: DETECTED
SAO2 % BLDV: 40.4 % — LOW (ref 67–88)
SARS-COV-2 RNA SPEC QL NAA+PROBE: SIGNIFICANT CHANGE UP
SODIUM SERPL-SCNC: 130 MMOL/L — LOW (ref 135–145)
TROPONIN I, HIGH SENSITIVITY RESULT: <4 NG/L — SIGNIFICANT CHANGE UP
WBC # BLD: 12.57 K/UL — HIGH (ref 3.8–10.5)
WBC # FLD AUTO: 12.57 K/UL — HIGH (ref 3.8–10.5)

## 2024-03-11 PROCEDURE — 71045 X-RAY EXAM CHEST 1 VIEW: CPT | Mod: 26

## 2024-03-11 PROCEDURE — 93010 ELECTROCARDIOGRAM REPORT: CPT

## 2024-03-11 PROCEDURE — 99291 CRITICAL CARE FIRST HOUR: CPT

## 2024-03-11 PROCEDURE — 99223 1ST HOSP IP/OBS HIGH 75: CPT

## 2024-03-11 PROCEDURE — 71275 CT ANGIOGRAPHY CHEST: CPT | Mod: 26,MC

## 2024-03-11 RX ORDER — BICTEGRAVIR SODIUM, EMTRICITABINE, AND TENOFOVIR ALAFENAMIDE FUMARATE 30; 120; 15 MG/1; MG/1; MG/1
1 TABLET ORAL DAILY
Refills: 0 | Status: DISCONTINUED | OUTPATIENT
Start: 2024-03-11 | End: 2024-03-13

## 2024-03-11 RX ORDER — SODIUM CHLORIDE 9 MG/ML
1000 INJECTION INTRAMUSCULAR; INTRAVENOUS; SUBCUTANEOUS ONCE
Refills: 0 | Status: COMPLETED | OUTPATIENT
Start: 2024-03-11 | End: 2024-03-11

## 2024-03-11 RX ORDER — CEFTRIAXONE 500 MG/1
1000 INJECTION, POWDER, FOR SOLUTION INTRAMUSCULAR; INTRAVENOUS EVERY 24 HOURS
Refills: 0 | Status: DISCONTINUED | OUTPATIENT
Start: 2024-03-12 | End: 2024-03-13

## 2024-03-11 RX ORDER — ONDANSETRON 8 MG/1
4 TABLET, FILM COATED ORAL EVERY 8 HOURS
Refills: 0 | Status: DISCONTINUED | OUTPATIENT
Start: 2024-03-11 | End: 2024-03-13

## 2024-03-11 RX ORDER — IPRATROPIUM/ALBUTEROL SULFATE 18-103MCG
3 AEROSOL WITH ADAPTER (GRAM) INHALATION ONCE
Refills: 0 | Status: COMPLETED | OUTPATIENT
Start: 2024-03-11 | End: 2024-03-11

## 2024-03-11 RX ORDER — AZITHROMYCIN 500 MG/1
500 TABLET, FILM COATED ORAL ONCE
Refills: 0 | Status: COMPLETED | OUTPATIENT
Start: 2024-03-11 | End: 2024-03-11

## 2024-03-11 RX ORDER — ACETAMINOPHEN 500 MG
650 TABLET ORAL EVERY 6 HOURS
Refills: 0 | Status: DISCONTINUED | OUTPATIENT
Start: 2024-03-11 | End: 2024-03-13

## 2024-03-11 RX ORDER — FLUCONAZOLE 150 MG/1
200 TABLET ORAL DAILY
Refills: 0 | Status: DISCONTINUED | OUTPATIENT
Start: 2024-03-11 | End: 2024-03-13

## 2024-03-11 RX ORDER — AZITHROMYCIN 500 MG/1
500 TABLET, FILM COATED ORAL DAILY
Refills: 0 | Status: COMPLETED | OUTPATIENT
Start: 2024-03-12 | End: 2024-03-13

## 2024-03-11 RX ORDER — CEFTRIAXONE 500 MG/1
1000 INJECTION, POWDER, FOR SOLUTION INTRAMUSCULAR; INTRAVENOUS ONCE
Refills: 0 | Status: COMPLETED | OUTPATIENT
Start: 2024-03-11 | End: 2024-03-11

## 2024-03-11 RX ADMIN — SODIUM CHLORIDE 1000 MILLILITER(S): 9 INJECTION INTRAMUSCULAR; INTRAVENOUS; SUBCUTANEOUS at 15:24

## 2024-03-11 RX ADMIN — Medication 3 MILLILITER(S): at 12:15

## 2024-03-11 RX ADMIN — Medication 2 TABLET(S): at 14:26

## 2024-03-11 RX ADMIN — Medication 2 TABLET(S): at 22:45

## 2024-03-11 RX ADMIN — FLUCONAZOLE 200 MILLIGRAM(S): 150 TABLET ORAL at 23:58

## 2024-03-11 RX ADMIN — SODIUM CHLORIDE 1000 MILLILITER(S): 9 INJECTION INTRAMUSCULAR; INTRAVENOUS; SUBCUTANEOUS at 12:14

## 2024-03-11 RX ADMIN — CEFTRIAXONE 1000 MILLIGRAM(S): 500 INJECTION, POWDER, FOR SOLUTION INTRAMUSCULAR; INTRAVENOUS at 14:57

## 2024-03-11 RX ADMIN — CEFTRIAXONE 100 MILLIGRAM(S): 500 INJECTION, POWDER, FOR SOLUTION INTRAMUSCULAR; INTRAVENOUS at 13:27

## 2024-03-11 RX ADMIN — AZITHROMYCIN 255 MILLIGRAM(S): 500 TABLET, FILM COATED ORAL at 14:25

## 2024-03-11 RX ADMIN — AZITHROMYCIN 500 MILLIGRAM(S): 500 TABLET, FILM COATED ORAL at 15:30

## 2024-03-11 NOTE — ED PROVIDER NOTE - OBJECTIVE STATEMENT
28-year-old male no seen past medical history except for HIV not currently taking medications presenting with 2 days of worsening shortness of breath.  States started as a congestion in his face and now has a cough and cannot walk more than a few steps before being short of breath.  He has no history of reactive airway disease.  No fevers reported.  No known sick contacts.

## 2024-03-11 NOTE — ED PROVIDER NOTE - CARE PLAN
1 Principal Discharge DX:	Multifocal pneumonia  Secondary Diagnosis:	Hypoxia  Secondary Diagnosis:	Sepsis

## 2024-03-11 NOTE — H&P ADULT - MLM HIDDEN
Teach back method used with patient concerning hibiclens wash, TB screening, incentive spirometer, pain management goals, and discharge needs list. Is goal 1500 yes

## 2024-03-11 NOTE — ED ADULT NURSE NOTE - OBJECTIVE STATEMENT
Patient presents to ED complaining of shortness of breath. Patient states he had low grade fever then was feeling better. Patient states this morning he became short of breath and came to ED. Patient denies sick contacts, denies chest pain. Patient reports chills.

## 2024-03-11 NOTE — H&P ADULT - HISTORY OF PRESENT ILLNESS
27 y/o M with HIV presents with 5 day history of SOB, cough and sore throat. Patient states that he noted he was getting SOB easily at home, also had productive cough and sore throat. He was taking mucinex at home without relief. He also reports fevers at home. He denies sick contact and recent travel. Patient stopped taking his HIV medication about 1 month ago. He states that his ID doc is in [place] and he is looking to transition care to someone local. He was diganosed with HIV couple of years ago, mode of transmission likely sexual. He denies chest pain, dysuria, N/V/D. In ED patient was noted to be hypoxic to 85% and placed on 4L NC with improvement in saturations.     Vital Signs Last 24 Hrs  T(C): 37.6 (11 Mar 2024 11:33), Max: 37.6 (11 Mar 2024 11:33)  T(F): 99.7 (11 Mar 2024 11:33), Max: 99.7 (11 Mar 2024 11:33)  HR: 110 (11 Mar 2024 13:16) (110 - 135)  BP: 115/79 (11 Mar 2024 13:16) (111/70 - 115/79)  BP(mean): 90 (11 Mar 2024 13:16) (90 - 90)  RR: 18 (11 Mar 2024 13:16) (18 - 18)  SpO2: 96% (11 Mar 2024 13:16) (85% - 97%)    Parameters below as of 11 Mar 2024 13:16  Patient On (Oxygen Delivery Method): nasal cannula  O2 Flow (L/min): 5

## 2024-03-11 NOTE — ED ADULT TRIAGE NOTE - CHIEF COMPLAINT QUOTE
Patient came from home with complaint of SOB for the past five days. Patient complaint of cough, cold, sore throat and fever. Patient took a Mucinex in AM.

## 2024-03-11 NOTE — H&P ADULT - NSCORESITESY/N_GEN_A_CORE_RD
Mild tenderness and ecchymosis at superior bridge of nose extending into lower eyes. No deformity
Yes

## 2024-03-11 NOTE — ED PROVIDER NOTE - NS ED MD TWO NIGHTS YN
Abhay continues to follow pt and family. Dr. Powers notified sw that she would be speaking with mom with regards to pt's need for a g-tube and  shunt. Sw present as Dr. Powers explained the need for both procedures. Sw reiterated the explanation. Mom asked about getting pt home as soon as possible. Mom in agreement with having g-tube placed sooner rather than later. Dr. Powers informed mom that she will order an upper GI and consult surgery. Mom voiced understanding. Mom was tearful and sw provided support. Sw showed mom a g-tuibe on the medical babydoll. Will follow.    Aruna Hanson [facility]  NICU   Ext. [phone]-phone  [email or web address]     Yes

## 2024-03-11 NOTE — ED ADULT NURSE REASSESSMENT NOTE - NS ED NURSE REASSESS COMMENT FT1
Patient later disclosed he is HIV positive and has not taken his medication for several months. Provider made aware.

## 2024-03-11 NOTE — ED ADULT NURSE NOTE - NSFALLUNIVINTERV_ED_ALL_ED
Bed/Stretcher in lowest position, wheels locked, appropriate side rails in place/Call bell, personal items and telephone in reach/Instruct patient to call for assistance before getting out of bed/chair/stretcher/Non-slip footwear applied when patient is off stretcher/[place] to call system/Physically safe environment - no spills, clutter or unnecessary equipment/Purposeful proactive rounding/Room/bathroom lighting operational, light cord in reach

## 2024-03-11 NOTE — H&P ADULT - ASSESSMENT
29 y/o M with PMH of HIV presents with shortness of breath admitted with PNA concerning for PCP PNA  29 y/o M with PMH of HIV presents with shortness of breath admitted with PNA concerning for PCP PNA     #Hypoxic respiratory failure  -saturating 85% on RA, placed on 4 L NC with improvement to 96%  -suspect due to PNA likely PCP given immunosuppressed state  -low threshold for ICU evaluation if worsening resp status  -start prednisone 40mg daily to avoid decompensation     #PNA, suspected PCP  -patient with b/l multifocal PNA  -given immunosuppressed state concern for PCP  -started on bactrim  -c/w concurrent steroids  -ID evaluation requested    #Oralpharyngeal candida  -started on diflucan    #HIV  -check t cell subset  -restart biktary  -ID evaluation    #DVT ppx  low risk    Offered to speak to family however patient declined  29 y/o M with PMH of HIV presents with shortness of breath found to have hypoxia admitted with PNA concerning for PCP PNA     #Hypoxic respiratory failure  -saturating 85% on RA, placed on 4 L NC with improvement to 96%  -suspect due to PNA -possible CAP vs PCP given immunosuppressed state  -low threshold for ICU evaluation if worsening resp status  -start prednisone 40mg daily for possible PCP to avoid decompensation     #PNA, suspected PCP vs CAP  -patient with b/l multifocal PNA  -given immunosuppressed state concern for PCP  -started on bactrim  -c/w concurrent steroids  -ID evaluation requested    #Oralpharyngeal candida  -started on diflucan    #HIV  -check t cell subset  -restart biktary (not compliant with meds for last 1 month)  -ID evaluation    #DVT ppx  low risk    Offered to speak to family however patient declined   Patient does not want his HIV diagnosis to be discussed with family

## 2024-03-11 NOTE — ED PROVIDER NOTE - PHYSICAL EXAMINATION
Vitals: I have reviewed the patients vital signs  General: nontoxic appearing  HEENT: Atraumatic, normocephalic, airway patent  Eyes: EOMI, tracking appropriately  Neck: no tracheal deviation  Chest/Lungs: no trauma, symmetric chest rise, speaking in complete sentences,  no resp distress tachypneic clear breath sounds  Heart: skin and extremities well perfused, tachycardic rate and regular rhythm  Neuro: A+Ox3, appears non focal  MSK: no deformities  Skin: no cyanosis, no jaundice   Psych:  Normal mood and affect

## 2024-03-11 NOTE — ED PROVIDER NOTE - PROGRESS NOTE DETAILS
Patient later admitted that he was HIV positive.  His chest x-ray and CT scan are consistent with multifocal pneumonia which in his setting of being HIV positive noncompliant with medications and not knowing his last CD4 or viral load is concerning for PCP pneumonia.  Patient be treated with community-acquired antibiotics in addition to Bactrim.  He is remains hypoxic has required oxygen at 4 L.  With this being the case I am going to admit him to the hospital.  Endorsed to Dr. Dill who will admit to her service

## 2024-03-11 NOTE — ED PROVIDER NOTE - CRITICAL CARE ATTENDING CONTRIBUTION TO CARE
Upon my evaluation, this patient had a high probability of imminent or life-threatening deterioration due to _sepsis hypoxia, which required my direct attention, intervention, and personal management.  The patient has a  medical condition that impairs one or more vital organ systems.  Frequent personal assessment and adjustment of medical interventions was performed.      I have personally provided 45 minutes of critical care time exclusive of time spent on separately billable procedures. Time includes review of laboratory data, radiology results, discussion with consultants, patient and family; monitoring for potential decompensation, as well as time spent retrieving data and reviewing the chart and documenting the visit. Interventions were performed as documented above.

## 2024-03-11 NOTE — ED PROVIDER NOTE - CLINICAL SUMMARY MEDICAL DECISION MAKING FREE TEXT BOX
28-year-old male past medical history of HIV not currently on medications presenting with symptoms of infection.  Most likely to be bacterial in nature.  However due to consider the possibility of a PE.  Chest x-ray and CT scan of both demonstrated bilateral multifocal pneumonia.  Patient is requiring oxygen as well.  Due to the severity of his symptoms need to consider PCP pneumonia in the differential.  As such we will give Bactrim.  Will also treat with other community-acquired antibiotic.  IV fluids to be given as well will require admission

## 2024-03-11 NOTE — CONSULT NOTE ADULT - SUBJECTIVE AND OBJECTIVE BOX
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 Reports that his HIV physician Dr Romero is "out east in [place] - an hour away" & since he moved to [facility], he followed with him a few times, but does not want to travel there on routine bases. Ran out of his prescription for Biktarvy & just stopped taking it. Currently not sexually active with his HIV neg spouse. He developed a stuffy dry nose, that he was trying to blow & a productive cough that he had been treating with Mucinex. Then developed fevers, never checked them but was having intermittent night sweats. Thought all of it was from a cold & refused to come to the ER, until today, when he felt as if he was gasping for air.    Here found afebrile, but hypoxic to 85% in ER. Responded to O2 support via - nasal cannula. WBC elevated to 12.57K with 62% polys. RVP tested + for RSV. CXR showed mod bibasilar infiltrates R > L. Started on treatment for PCP PNA. ID called.     REVIEW OF SYSTEMS:  All other review of systems negative (Comprehensive ROS) as above     PAST MEDICAL & SURGICAL HISTORY:  Localized enlarged lymph nodes  HIV disease  S/P biopsy of neck      Allergies  No Known Allergies    Intolerances      Antimicrobials Day #  : 1  fluconAZOLE   Tablet 200 milliGRAM(s) Oral daily  trimethoprim  160 mG/sulfamethoxazole 800 mG 2 Tablet(s) Oral three times a day    Other Medications:  acetaminophen     Tablet .. 650 milliGRAM(s) Oral every 6 hours PRN  ondansetron Injectable 4 milliGRAM(s) IV Push every 8 hours PRN  predniSONE   Tablet 40 milliGRAM(s) Oral daily      FAMILY HISTORY:  [place]     SOCIAL HISTORY:  Smoking: No    ETOH: No   Drug Use: No        T(F): 99.7 (03-11-24 @ 11:33), Max: 99.7 (03-11-24 @ 11:33)  HR: 110 (03-11-24 @ 13:16)  BP: 115/79 (03-11-24 @ 13:16)  RR: 18 (03-11-24 @ 13:16)  SpO2: 96% (03-11-24 @ 13:16)  Wt(kg): --    PHYSICAL EXAM:  General: alert, no acute distress  Eyes:  anicteric, no conjunctival injection, no discharge  Oropharynx: no lesions or injection. Tongue with white coating ? dry mouth 	  Neck: supple, without adenopathy  Lungs: clear to auscultation  Heart: regular rate and rhythm; no murmur, rubs or gallops  Abdomen: soft, nondistended, nontender, without mass or organomegaly  Skin: no lesions  Extremities: no clubbing, cyanosis, or edema  Neurologic: alert, oriented, moves all extremities    LAB RESULTS:                        10.0   12.57 )-----------( 375      ( 11 Mar 2024 12:18 )             29.6     03-11    130<L>  |  94<L>  |  15  ----------------------------<  105<H>  5.0   |  25  |  1.20    Ca    9.0      11 Mar 2024 12:18    TPro  9.5<H>  /  Alb  1.8<L>  /  TBili  0.4  /  DBili  x   /  AST  61<H>  /  ALT  31  /  AlkPhos  100  03-11    LIVER FUNCTIONS - ( 11 Mar 2024 12:18 )  Alb: 1.8 g/dL / Pro: 9.5 g/dL / ALK PHOS: 100 U/L / ALT: 31 U/L / AST: 61 U/L / GGT: x           Urinalysis Basic - ( 11 Mar 2024 12:18 )    Color: x / Appearance: x / SG: x / pH: x  Gluc: 105 mg/dL / Ketone: x  / Bili: x / Urobili: x   Blood: x / Protein: x / Nitrite: x   Leuk Esterase: x / RBC: x / WBC x   Sq Epi: x / Non Sq Epi: x / Bacteria: x        MICROBIOLOGY:  RECENT CULTURES:      RADIOLOGY REVIEWED:  < from: CT Angio Chest PE Protocol w/ IV Cont (03.11.24 @ 12:52) >  IMPRESSION:  Negative for pulmonary emboli.  Extensive bilateral multifocal pneumonia and bronchiolitis.  Prominent main pulmonary artery may reflect pulmonary hypertension  Mild mediastinal lymphadenopathy    < end of copied text >

## 2024-03-11 NOTE — ED PROVIDER NOTE - NS_BEDUNITTYPES_ED_ALL_ED
[Potential consequences of obesity discussed] : Potential consequences of obesity discussed [Benefits of weight loss discussed] : Benefits of weight loss discussed [Structured Weight Management Program suggested:] : Structured weight management program suggested [Encouraged to maintain food diary] : Encouraged to maintain food diary [Encouraged to increase physical activity] : Encouraged to increase physical activity [Encouraged to use exercise tracking device] : Encouraged to use exercise tracking device [Weigh Self Weekly] : weigh self weekly [Decrease Portions] : decrease portions [____ min/wk Activity] : [unfilled] min/wk activity [Keep Food Diary] : keep food diary [Good understanding] : Patient has a good understanding of disease, goals and obesity follow-up plan MED/SURG

## 2024-03-11 NOTE — CONSULT NOTE ADULT - ASSESSMENT
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   Ran out of his prescription for Biktarvy last month & just stopped taking it.  He developed a stuffy dry nose, a productive cough that he had been treating with Mucinex. Then developed fevers with intermittent night sweats. Thought all of it was from a cold & refused to come to the ER, until today, when he felt as if he was gasping for air.    Here found afebrile, but hypoxic to 85% in ER.   Responded to O2 support via - nasal cannula.  WBC elevated to 12.57K with 62% polys.   RVP tested + for RSV.   CXR showed mod bibasilar infiltrates R > L.   Started on treatment for PCP PNA.     At present, he appears strikingly non toxic, is not febrile, lungs are CTA, voice is nasal from the cold, but he is coughing up thick yellow-green phlegm. Imaging with bilateral PNA. I suspect RSV viral PNA complicated by CAP.  Although it is fair to continue treatment for PCP pending CD4 count, low suspicion for PCP - given above symptoms.     PLAN:  Okay to continue short course of Bactrim - pending CD4 cts.  Start CTX & Doxycycline for suspected CAP complicating RSV viral PNA  Check sputum for cx & sensitivity  Procalcitonin level in AM  Limited course of oral diflucan  Check urine for legionella.  Resume Biktarvy  Patient advised on the importance of compliance - he is looking for ID physician who treats HIV, spoke to Dr Dill, he should be given contact info to our office; so that he could follow up with Dr Encarnacion or Dr Palomo.

## 2024-03-12 LAB
4/8 RATIO: 0.18 RATIO — LOW (ref 0.9–3.6)
ABS CD8: 649 CELLS/UL — SIGNIFICANT CHANGE UP (ref 142–740)
ALBUMIN SERPL ELPH-MCNC: 1.5 G/DL — LOW (ref 3.3–5)
ALP SERPL-CCNC: 82 U/L — SIGNIFICANT CHANGE UP (ref 40–120)
ALT FLD-CCNC: 33 U/L — SIGNIFICANT CHANGE UP (ref 10–45)
ANION GAP SERPL CALC-SCNC: -4 MMOL/L — LOW (ref 5–17)
AST SERPL-CCNC: 57 U/L — HIGH (ref 10–40)
BASOPHILS # BLD AUTO: 0.04 K/UL — SIGNIFICANT CHANGE UP (ref 0–0.2)
BASOPHILS NFR BLD AUTO: 0.6 % — SIGNIFICANT CHANGE UP (ref 0–2)
BILIRUB SERPL-MCNC: 0.3 MG/DL — SIGNIFICANT CHANGE UP (ref 0.2–1.2)
BUN SERPL-MCNC: 12 MG/DL — SIGNIFICANT CHANGE UP (ref 7–23)
CALCIUM SERPL-MCNC: 8.6 MG/DL — SIGNIFICANT CHANGE UP (ref 8.4–10.5)
CD16+CD56+ CELLS NFR BLD: 8 % — SIGNIFICANT CHANGE UP (ref 5–23)
CD16+CD56+ CELLS NFR SPEC: 120 CELLS/UL — SIGNIFICANT CHANGE UP (ref 71–410)
CD19 BLASTS SPEC-ACNC: 28 % — HIGH (ref 6–24)
CD19 BLASTS SPEC-ACNC: 422 CELLS/UL — SIGNIFICANT CHANGE UP (ref 84–469)
CD3 BLASTS SPEC-ACNC: 63 % — SIGNIFICANT CHANGE UP (ref 59–83)
CD3 BLASTS SPEC-ACNC: 945 CELLS/UL — SIGNIFICANT CHANGE UP (ref 672–1870)
CD4 %: 8 % — LOW (ref 30–62)
CD8 %: 44 % — HIGH (ref 12–36)
CHLORIDE SERPL-SCNC: 109 MMOL/L — HIGH (ref 96–108)
CO2 SERPL-SCNC: 28 MMOL/L — SIGNIFICANT CHANGE UP (ref 22–31)
CREAT SERPL-MCNC: 1.13 MG/DL — SIGNIFICANT CHANGE UP (ref 0.5–1.3)
EGFR: 91 ML/MIN/1.73M2 — SIGNIFICANT CHANGE UP
EOSINOPHIL # BLD AUTO: 0.04 K/UL — SIGNIFICANT CHANGE UP (ref 0–0.5)
EOSINOPHIL NFR BLD AUTO: 0.6 % — SIGNIFICANT CHANGE UP (ref 0–6)
GLUCOSE SERPL-MCNC: 89 MG/DL — SIGNIFICANT CHANGE UP (ref 70–99)
GRAM STN FLD: SIGNIFICANT CHANGE UP
HCT VFR BLD CALC: 27.5 % — LOW (ref 39–50)
HGB BLD-MCNC: 8.9 G/DL — LOW (ref 13–17)
IMM GRANULOCYTES NFR BLD AUTO: 3.9 % — HIGH (ref 0–0.9)
LDH SERPL L TO P-CCNC: 358 U/L — HIGH (ref 50–242)
LYMPHOCYTES # BLD AUTO: 1.48 K/UL — SIGNIFICANT CHANGE UP (ref 1–3.3)
LYMPHOCYTES # BLD AUTO: 23.2 % — SIGNIFICANT CHANGE UP (ref 13–44)
MCHC RBC-ENTMCNC: 27.8 PG — SIGNIFICANT CHANGE UP (ref 27–34)
MCHC RBC-ENTMCNC: 32.4 GM/DL — SIGNIFICANT CHANGE UP (ref 32–36)
MCV RBC AUTO: 85.9 FL — SIGNIFICANT CHANGE UP (ref 80–100)
MONOCYTES # BLD AUTO: 0.55 K/UL — SIGNIFICANT CHANGE UP (ref 0–0.9)
MONOCYTES NFR BLD AUTO: 8.6 % — SIGNIFICANT CHANGE UP (ref 2–14)
NEUTROPHILS # BLD AUTO: 4.02 K/UL — SIGNIFICANT CHANGE UP (ref 1.8–7.4)
NEUTROPHILS NFR BLD AUTO: 63.1 % — SIGNIFICANT CHANGE UP (ref 43–77)
NRBC # BLD: 0 /100 WBCS — SIGNIFICANT CHANGE UP (ref 0–0)
PLATELET # BLD AUTO: 342 K/UL — SIGNIFICANT CHANGE UP (ref 150–400)
POTASSIUM SERPL-MCNC: 4.4 MMOL/L — SIGNIFICANT CHANGE UP (ref 3.5–5.3)
POTASSIUM SERPL-SCNC: 4.4 MMOL/L — SIGNIFICANT CHANGE UP (ref 3.5–5.3)
PROCALCITONIN SERPL-MCNC: 0.88 NG/ML — HIGH
PROT SERPL-MCNC: 8.3 G/DL — SIGNIFICANT CHANGE UP (ref 6–8.3)
RBC # BLD: 3.2 M/UL — LOW (ref 4.2–5.8)
RBC # FLD: 14.2 % — SIGNIFICANT CHANGE UP (ref 10.3–14.5)
SODIUM SERPL-SCNC: 133 MMOL/L — LOW (ref 135–145)
SPECIMEN SOURCE: SIGNIFICANT CHANGE UP
T-CELL CD4 SUBSET PNL BLD: 119 CELLS/UL — LOW (ref 489–1457)
WBC # BLD: 6.38 K/UL — SIGNIFICANT CHANGE UP (ref 3.8–10.5)
WBC # FLD AUTO: 6.38 K/UL — SIGNIFICANT CHANGE UP (ref 3.8–10.5)

## 2024-03-12 PROCEDURE — 99233 SBSQ HOSP IP/OBS HIGH 50: CPT | Mod: GC

## 2024-03-12 RX ORDER — INFLUENZA VIRUS VACCINE 15; 15; 15; 15 UG/.5ML; UG/.5ML; UG/.5ML; UG/.5ML
0.5 SUSPENSION INTRAMUSCULAR ONCE
Refills: 0 | Status: DISCONTINUED | OUTPATIENT
Start: 2024-03-12 | End: 2024-03-13

## 2024-03-12 RX ADMIN — FLUCONAZOLE 200 MILLIGRAM(S): 150 TABLET ORAL at 12:44

## 2024-03-12 RX ADMIN — Medication 2 TABLET(S): at 20:18

## 2024-03-12 RX ADMIN — AZITHROMYCIN 500 MILLIGRAM(S): 500 TABLET, FILM COATED ORAL at 12:45

## 2024-03-12 RX ADMIN — Medication 40 MILLIGRAM(S): at 05:59

## 2024-03-12 RX ADMIN — Medication 2 TABLET(S): at 14:21

## 2024-03-12 RX ADMIN — Medication 2 TABLET(S): at 06:00

## 2024-03-12 RX ADMIN — CEFTRIAXONE 100 MILLIGRAM(S): 500 INJECTION, POWDER, FOR SOLUTION INTRAMUSCULAR; INTRAVENOUS at 12:47

## 2024-03-12 RX ADMIN — BICTEGRAVIR SODIUM, EMTRICITABINE, AND TENOFOVIR ALAFENAMIDE FUMARATE 1 TABLET(S): 30; 120; 15 TABLET ORAL at 12:45

## 2024-03-12 NOTE — PHARMACOTHERAPY INTERVENTION NOTE - NSPHARMCOMMPTEDUFT
Pulmonary progress note.    S:  He is doing fine.  No new events, last night some cough, no fever, no using oxygen.    O:B   Visit Vitals  /66 (BP Location: RUE - Right upper extremity, Patient Position: Semi-Farias's)   Pulse 77   Temp 98.3 °F (36.8 °C) (Oral)   Resp 18   Ht 5' 9\" (1.753 m)   Wt 104.1 kg (229 lb 8 oz)   SpO2 96%   BMI 33.89 kg/m²     Alert and oriented x3, in no respiratory distress.  Chest tube output over the past 24 hours: 10 mL    I/O last 3 completed shifts:  In: 2082 [P.O.:1250; I.V.:732; IV Piggyback:100]  Out: 10 [Chest Tube:10]  No intake/output data recorded.    Lung auscultation with slightly diminished breath sounds on the right subscapular area.  Chest tube insertion site looks fine  Chest x-ray revealed complete resolution of the pleural effusion, there is only some inflammation and subsegmental atelectasis left:      The chest tube was removed using a sterile technique.  Sterile dressing was applied.    IMP:  1. Complicated parapneumonic effusion/empyema.  Treated with chest tube drainage, tPA and dornase and antibiotics.  He will complete a course of oral antibiotics.  I will make sure there is proper follow-up in about 4 weeks with a follow-up CT scan of the chest to assure time interval resolution of all the inflammation and lung infiltrates, CBC, sedimentation rate.    2. Recent history of COVID-19.  Supportive treatment.    Case discussed with his nurse.  I have no objection to his discharge home in the next 24 hours.  I will sign off.       current medications

## 2024-03-12 NOTE — PROGRESS NOTE ADULT - ASSESSMENT
29y/o Male PMH HIV who presents with a chief complaint of SOB      #Hypoxic respiratory failure  -saturating 85% on RA, placed on 4 L NC with improvement to 96%  -suspect due to PNA -possible CAP vs PCP given immunosuppressed state  -low threshold for ICU evaluation if worsening resp status  -c/w prednisone 40mg daily for possible PCP to avoid decompensation     #PNA, suspected PCP vs CAP  -patient with b/l multifocal PNA  -given immunosuppressed state concern for PCP  -c/w bactrim  -c/w concurrent steroids  -ID consult appreciated    #Oralpharyngeal candida  -c/w diflucan    #HIV  -check t cell subset  -restart biktary (not compliant with meds for last 1 month)  -ID evaluation    #DVT ppx  low risk    Offered to speak to family however patient declined   Patient does not want his HIV diagnosis to be discussed with family    Discussed with Dr. Franklin

## 2024-03-12 NOTE — PATIENT PROFILE ADULT - FALL HARM RISK - UNIVERSAL INTERVENTIONS
Bed in lowest position, wheels locked, appropriate side rails in place/Call bell, personal items and telephone in reach/Instruct patient to call for assistance before getting out of bed or chair/Non-slip footwear when patient is out of bed/[place] to call system/Physically safe environment - no spills, clutter or unnecessary equipment/Purposeful Proactive Rounding/Room/bathroom lighting operational, light cord in reach

## 2024-03-13 ENCOUNTER — TRANSCRIPTION ENCOUNTER (OUTPATIENT)
Age: 29
End: 2024-03-13

## 2024-03-13 VITALS
TEMPERATURE: 98 F | SYSTOLIC BLOOD PRESSURE: 104 MMHG | RESPIRATION RATE: 17 BRPM | HEART RATE: 67 BPM | DIASTOLIC BLOOD PRESSURE: 49 MMHG | OXYGEN SATURATION: 93 %

## 2024-03-13 LAB
ALBUMIN SERPL ELPH-MCNC: 1.8 G/DL — LOW (ref 3.3–5)
ALP SERPL-CCNC: 96 U/L — SIGNIFICANT CHANGE UP (ref 40–120)
ALT FLD-CCNC: 43 U/L — SIGNIFICANT CHANGE UP (ref 10–45)
ANION GAP SERPL CALC-SCNC: 8 MMOL/L — SIGNIFICANT CHANGE UP (ref 5–17)
AST SERPL-CCNC: 67 U/L — HIGH (ref 10–40)
BILIRUB SERPL-MCNC: 0.2 MG/DL — SIGNIFICANT CHANGE UP (ref 0.2–1.2)
BUN SERPL-MCNC: 16 MG/DL — SIGNIFICANT CHANGE UP (ref 7–23)
CALCIUM SERPL-MCNC: 9.3 MG/DL — SIGNIFICANT CHANGE UP (ref 8.4–10.5)
CHLORIDE SERPL-SCNC: 100 MMOL/L — SIGNIFICANT CHANGE UP (ref 96–108)
CO2 SERPL-SCNC: 26 MMOL/L — SIGNIFICANT CHANGE UP (ref 22–31)
CREAT SERPL-MCNC: 1.28 MG/DL — SIGNIFICANT CHANGE UP (ref 0.5–1.3)
EGFR: 78 ML/MIN/1.73M2 — SIGNIFICANT CHANGE UP
GLUCOSE SERPL-MCNC: 115 MG/DL — HIGH (ref 70–99)
HCT VFR BLD CALC: 28.8 % — LOW (ref 39–50)
HGB BLD-MCNC: 9.6 G/DL — LOW (ref 13–17)
HIV 1+2 AB+HIV1 P24 AG SERPL QL IA: REACTIVE
HIV1+2 AB SPEC QL: ABNORMAL
HIV1+2 AB SPEC QL: SIGNIFICANT CHANGE UP
LEGIONELLA AG UR QL: NEGATIVE — SIGNIFICANT CHANGE UP
MAGNESIUM SERPL-MCNC: 2.4 MG/DL — SIGNIFICANT CHANGE UP (ref 1.6–2.6)
MCHC RBC-ENTMCNC: 28.3 PG — SIGNIFICANT CHANGE UP (ref 27–34)
MCHC RBC-ENTMCNC: 33.3 GM/DL — SIGNIFICANT CHANGE UP (ref 32–36)
MCV RBC AUTO: 85 FL — SIGNIFICANT CHANGE UP (ref 80–100)
NRBC # BLD: 0 /100 WBCS — SIGNIFICANT CHANGE UP (ref 0–0)
PHOSPHATE SERPL-MCNC: 5.3 MG/DL — HIGH (ref 2.5–4.5)
PLATELET # BLD AUTO: 409 K/UL — HIGH (ref 150–400)
POTASSIUM SERPL-MCNC: 4.8 MMOL/L — SIGNIFICANT CHANGE UP (ref 3.5–5.3)
POTASSIUM SERPL-SCNC: 4.8 MMOL/L — SIGNIFICANT CHANGE UP (ref 3.5–5.3)
PROT SERPL-MCNC: 8.9 G/DL — HIGH (ref 6–8.3)
RBC # BLD: 3.39 M/UL — LOW (ref 4.2–5.8)
RBC # FLD: 14.3 % — SIGNIFICANT CHANGE UP (ref 10.3–14.5)
SODIUM SERPL-SCNC: 134 MMOL/L — LOW (ref 135–145)
WBC # BLD: 9.02 K/UL — SIGNIFICANT CHANGE UP (ref 3.8–10.5)
WBC # FLD AUTO: 9.02 K/UL — SIGNIFICANT CHANGE UP (ref 3.8–10.5)

## 2024-03-13 PROCEDURE — 82803 BLOOD GASES ANY COMBINATION: CPT

## 2024-03-13 PROCEDURE — 87637 SARSCOV2&INF A&B&RSV AMP PRB: CPT

## 2024-03-13 PROCEDURE — 83615 LACTATE (LD) (LDH) ENZYME: CPT

## 2024-03-13 PROCEDURE — 86359 T CELLS TOTAL COUNT: CPT

## 2024-03-13 PROCEDURE — 96367 TX/PROPH/DG ADDL SEQ IV INF: CPT

## 2024-03-13 PROCEDURE — 93005 ELECTROCARDIOGRAM TRACING: CPT

## 2024-03-13 PROCEDURE — 84484 ASSAY OF TROPONIN QUANT: CPT

## 2024-03-13 PROCEDURE — 94640 AIRWAY INHALATION TREATMENT: CPT

## 2024-03-13 PROCEDURE — 80053 COMPREHEN METABOLIC PANEL: CPT

## 2024-03-13 PROCEDURE — 86702 HIV-2 ANTIBODY: CPT

## 2024-03-13 PROCEDURE — 87389 HIV-1 AG W/HIV-1&-2 AB AG IA: CPT

## 2024-03-13 PROCEDURE — 86360 T CELL ABSOLUTE COUNT/RATIO: CPT

## 2024-03-13 PROCEDURE — 87449 NOS EACH ORGANISM AG IA: CPT

## 2024-03-13 PROCEDURE — 84100 ASSAY OF PHOSPHORUS: CPT

## 2024-03-13 PROCEDURE — 87040 BLOOD CULTURE FOR BACTERIA: CPT

## 2024-03-13 PROCEDURE — 71045 X-RAY EXAM CHEST 1 VIEW: CPT

## 2024-03-13 PROCEDURE — 96365 THER/PROPH/DIAG IV INF INIT: CPT

## 2024-03-13 PROCEDURE — 36415 COLL VENOUS BLD VENIPUNCTURE: CPT

## 2024-03-13 PROCEDURE — 99291 CRITICAL CARE FIRST HOUR: CPT | Mod: 25

## 2024-03-13 PROCEDURE — 86357 NK CELLS TOTAL COUNT: CPT

## 2024-03-13 PROCEDURE — 85025 COMPLETE CBC W/AUTO DIFF WBC: CPT

## 2024-03-13 PROCEDURE — 83605 ASSAY OF LACTIC ACID: CPT

## 2024-03-13 PROCEDURE — 99239 HOSP IP/OBS DSCHRG MGMT >30: CPT | Mod: GC

## 2024-03-13 PROCEDURE — 87103 BLOOD FUNGUS CULTURE: CPT

## 2024-03-13 PROCEDURE — 71275 CT ANGIOGRAPHY CHEST: CPT | Mod: MC

## 2024-03-13 PROCEDURE — 84145 PROCALCITONIN (PCT): CPT

## 2024-03-13 PROCEDURE — 86701 HIV-1ANTIBODY: CPT

## 2024-03-13 PROCEDURE — 83735 ASSAY OF MAGNESIUM: CPT

## 2024-03-13 PROCEDURE — 83880 ASSAY OF NATRIURETIC PEPTIDE: CPT

## 2024-03-13 PROCEDURE — 87070 CULTURE OTHR SPECIMN AEROBIC: CPT

## 2024-03-13 PROCEDURE — 85027 COMPLETE CBC AUTOMATED: CPT

## 2024-03-13 PROCEDURE — 86355 B CELLS TOTAL COUNT: CPT

## 2024-03-13 RX ORDER — FLUCONAZOLE 150 MG/1
1 TABLET ORAL
Qty: 7 | Refills: 0
Start: 2024-03-13 | End: 2024-03-19

## 2024-03-13 RX ORDER — SODIUM CHLORIDE 9 MG/ML
1000 INJECTION INTRAMUSCULAR; INTRAVENOUS; SUBCUTANEOUS ONCE
Refills: 0 | Status: COMPLETED | OUTPATIENT
Start: 2024-03-13 | End: 2024-03-13

## 2024-03-13 RX ORDER — BICTEGRAVIR SODIUM, EMTRICITABINE, AND TENOFOVIR ALAFENAMIDE FUMARATE 30; 120; 15 MG/1; MG/1; MG/1
1 TABLET ORAL
Refills: 0 | DISCHARGE

## 2024-03-13 RX ORDER — BICTEGRAVIR SODIUM, EMTRICITABINE, AND TENOFOVIR ALAFENAMIDE FUMARATE 30; 120; 15 MG/1; MG/1; MG/1
1 TABLET ORAL
Qty: 30 | Refills: 0
Start: 2024-03-13 | End: 2024-04-11

## 2024-03-13 RX ORDER — AZITHROMYCIN 500 MG/1
1 TABLET, FILM COATED ORAL
Qty: 9 | Refills: 0
Start: 2024-03-13 | End: 2024-04-11

## 2024-03-13 RX ORDER — CEFUROXIME AXETIL 250 MG
1 TABLET ORAL
Qty: 14 | Refills: 0
Start: 2024-03-13 | End: 2024-03-19

## 2024-03-13 RX ORDER — CEFUROXIME AXETIL 250 MG
500 TABLET ORAL EVERY 12 HOURS
Refills: 0 | Status: DISCONTINUED | OUTPATIENT
Start: 2024-03-13 | End: 2024-03-13

## 2024-03-13 RX ADMIN — FLUCONAZOLE 200 MILLIGRAM(S): 150 TABLET ORAL at 12:11

## 2024-03-13 RX ADMIN — SODIUM CHLORIDE 1000 MILLILITER(S): 9 INJECTION INTRAMUSCULAR; INTRAVENOUS; SUBCUTANEOUS at 10:09

## 2024-03-13 RX ADMIN — Medication 2 TABLET(S): at 16:48

## 2024-03-13 RX ADMIN — BICTEGRAVIR SODIUM, EMTRICITABINE, AND TENOFOVIR ALAFENAMIDE FUMARATE 1 TABLET(S): 30; 120; 15 TABLET ORAL at 12:11

## 2024-03-13 RX ADMIN — Medication 40 MILLIGRAM(S): at 05:20

## 2024-03-13 RX ADMIN — AZITHROMYCIN 500 MILLIGRAM(S): 500 TABLET, FILM COATED ORAL at 12:10

## 2024-03-13 RX ADMIN — Medication 2 TABLET(S): at 05:20

## 2024-03-13 RX ADMIN — CEFTRIAXONE 100 MILLIGRAM(S): 500 INJECTION, POWDER, FOR SOLUTION INTRAMUSCULAR; INTRAVENOUS at 12:10

## 2024-03-13 NOTE — DISCHARGE NOTE PROVIDER - NSDCCPCAREPLAN_GEN_ALL_CORE_FT
PRINCIPAL DISCHARGE DIAGNOSIS  Diagnosis: Multifocal pneumonia  Assessment and Plan of Treatment: You were found to have pneumonia and tested positive for RSV. You improved after starting several antibiotics. Please follow up with your PCP Dr. Thomas Collins for closer management.      SECONDARY DISCHARGE DIAGNOSES  Diagnosis: Hypoxia  Assessment and Plan of Treatment: You came in due to difficulty breathing and required oxygen supplementation. You improved and are breathing well on room air. Please follow up with your PCP for close management. If your symptoms return or worsens with new symptoms, please return to the ED for re-evaluation.    Diagnosis: Oral thrush  Assessment and Plan of Treatment: You were found to have oral thrush and you are given diflucan 200mg for 10 days. Please finish your course of medication.     PRINCIPAL DISCHARGE DIAGNOSIS  Diagnosis: Multifocal pneumonia  Assessment and Plan of Treatment: You were found to have pneumonia and tested positive for RSV. You improved after starting several antibiotics. Please follow up with your PCP Dr. Thomas Collins for closer management.      SECONDARY DISCHARGE DIAGNOSES  Diagnosis: Immunocompromised state  Assessment and Plan of Treatment: You are immunocompromised and require prophylactic medications, which are being sent to your pharmacy [facility]. It is important to follow up with your PCP and new ID.    Diagnosis: Hypoxia  Assessment and Plan of Treatment: You came in due to difficulty breathing and required oxygen supplementation. You improved and are breathing well on room air. Please follow up with your PCP for close management. If your symptoms return or worsens with new symptoms, please return to the ED for re-evaluation.    Diagnosis: Oral thrush  Assessment and Plan of Treatment: You were found to have oral thrush and you are given diflucan 200mg for 7 days. Please finish your course of medication.

## 2024-03-13 NOTE — DISCHARGE NOTE PROVIDER - PROVIDER TOKENS
PROVIDER:[TOKEN:[[id number]],ESTABLISHEDPATIENT:[T]] PROVIDER:[TOKEN:[[id number]],ESTABLISHEDPATIENT:[T]],PROVIDER:[TOKEN:[[id number]]],PROVIDER:[TOKEN:[[id number]]]

## 2024-03-13 NOTE — DISCHARGE NOTE PROVIDER - CARE PROVIDERS DIRECT ADDRESSES
,[email or web address]

## 2024-03-13 NOTE — DISCHARGE NOTE NURSING/CASE MANAGEMENT/SOCIAL WORK - PATIENT PORTAL LINK FT
You can access the FollowMyHealth Patient Portal offered by [facility] by registering at the following website: [email or web address]. By joining [facility]’s FollowMyHealth portal, you will also be able to view your health information using other applications (apps) compatible with our system.

## 2024-03-13 NOTE — PROGRESS NOTE ADULT - ASSESSMENT
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   Ran out of his prescription for Biktarvy - 4 - 6 wks ago & just stopped taking it.  He developed a stuffy dry nose, a productive cough that he had been treating with Mucinex. Then developed fevers with intermittent night sweats. Thought all of it was from a cold & refused to come to the ER, until 3/11/24, when he felt as if he was gasping for air.    Here found afebrile, but hypoxic to 85% in ER.   Responded to O2 support via - nasal cannula.  WBC elevated to 12.57K with 62% polys.   RVP tested + for RSV.   CXR showed mod bibasilar infiltrates R > L.   Started on treatment for PCP PNA with Bactrim, as well CAP.   CT Angio Chest PE showed no PE. But extensive bilateral multifocal pneumonia and bronchiolitis. Prominent main pulmonary artery may reflect pulmonary hypertension. Mild mediastinal lymphadenopathy  At present, he appears strikingly non toxic, is not febrile, lungs are CTA, voice is nasal from the cold, but he is coughing up thick yellow-green phlegm. Imaging with bilateral PNA. I suspect RSV viral PNA complicated by CAP.  CD4 count has now resulted as 8, viral load is still pending - given that he has AIDS - PCP remains the most concerning dx.    Procalcitonin is elevated at 0.88  Urine for legionella antigen & sputum gram stain is negative.     PLAN:  Continue Bactrim 2 DS TID.  Change CTX to PO Ceftin 500 mg BID & Doxycycline - for total of 7 days, for suspected CAP complicating RSV viral PNA  Continue oral diflucan for one wk - whitish tongue ? candida.  Start azithromycin 600 mg twice a wk for prophylaxis against MAC.   Continue Biktarvy.  Viral load still pending.   Patient advised on the importance of compliance - now that he has AIDS, it is even more important.    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   Ran out of his prescription for Biktarvy - 4 - 6 wks ago & just stopped taking it.  He developed a stuffy dry nose, a productive cough that he had been treating with Mucinex. Then developed fevers with intermittent night sweats. Thought all of it was from a cold & refused to come to the ER, until 3/11/24, when he felt as if he was gasping for air.    Here found afebrile, but hypoxic to 85% in ER.   Responded to O2 support via - nasal cannula.  WBC elevated to 12.57K with 62% polys.   RVP tested + for RSV.   CXR showed mod bibasilar infiltrates R > L.   Started on treatment for PCP PNA with Bactrim, as well CAP.   CT Angio Chest PE showed no PE. But extensive bilateral multifocal pneumonia and bronchiolitis. Prominent main pulmonary artery may reflect pulmonary hypertension. Mild mediastinal lymphadenopathy  At present, he appears strikingly non toxic, is not febrile, lungs are CTA, voice is nasal from the cold, but he is coughing up thick yellow-green phlegm. Imaging with bilateral PNA. I suspect RSV viral PNA complicated by CAP.  CD4 count has now resulted as 8, viral load is still pending - given that he has AIDS - PCP remains the most concerning dx.    Procalcitonin is elevated at 0.88  Urine for legionella antigen & sputum gram stain is negative.     PLAN:  Continue Bactrim 2 DS TID.  Change CTX to PO Ceftin 500 mg BID for total of 7 days, for suspected CAP complicating RSV viral PNA  Stop Doxycycline.  Start azithromycin 600 mg twice a wk for prophylaxis against MAC.   Continue Biktarvy.  Viral load still pending.   Patient advised on the importance of compliance - now that he has AIDS, it is even more important.

## 2024-03-13 NOTE — DISCHARGE NOTE PROVIDER - HOSPITAL COURSE
27y/o M w/ PMH HIV presented to ED for SOB, cough, fever and sore throat x 5days. Patient reports stopping his HIV medications 1 month ago due to 27y/o M w/ PMH HIV presented to ED for SOB, cough, fever and sore throat x 5 days. Patient reports running out of his HIV medication (Biktarvy) 1 month ago and trying to change his ID doctor to a [place] location. In the ED, Pt received O2 supplementation due to hypoxia SpO2 85% on RA, and SpO2 improved 97%. Pt was admitted for acute respiratory failure 2/2 multifocal pneumonia with HIV and AIDS. Pt with CD4 count 119 requiring prophylactic antibiotics for Pneumocystis pneumonia. Pt was evaluated by infectious disease, restarted on Biktarvy and started on Ceftriaxone, bactrim, diflucan and azithromycin. 27y/o M w/ PMH HIV presented to ED for SOB, cough, fever and sore throat x 5 days. Patient reports running out of his HIV medication (Biktarvy) 1 month ago and trying to change his ID doctor to a [place] location. In the ED, Pt received O2 supplementation due to hypoxia SpO2 85% on RA, and SpO2 improved 96%. On labs, Pt was RSV positive. Pt was admitted for acute respiratory failure 2/2 multifocal pneumonia with AIDS. Pt with CD4 count 119 requiring prophylactic antibiotics for PCP and MAC. Pt was evaluated by infectious disease, restarted on Biktarvy and started on Ceftriaxone, bactrim, diflucan and azithromycin.           CONSULT: Infectious Disease    IMAGING:	  Xray Chest 1 View- PORTABLE-Urgent (03.11.24 @ 12:42)   IMPRESSION: Moderate bibasilar infiltrates right greater than left at   this time.    CT Angio Chest PE Protocol w/ IV Cont (03.11.24 @ 12:52)   FINDINGS:  LUNGS AND AIRWAYS: Patent central airways.  Multifocal bilateral areas of   airspace consolidation and scattered bilateral tree-in-bud parenchymal   opacities consistent with multifocal bilateral pneumonia and   bronchiolitis..  PLEURA: No pleural effusion.  MEDIASTINUM AND FRANNY: Subcentimeter and minimally enlarged mediastinal   lymph nodes. Largest left paratracheal lymph node measures up to 2.3 x 1.4 cm.  VESSELS: No pulmonary emboli. Prominent main pulmonary artery may reflect   pulmonary hypertension..  HEART: Heart size is normal. No pericardial effusion.  CHEST WALL AND LOWER NECK: Within normal limits.  VISUALIZED UPPER ABDOMEN: Within normal limits.  BONES: Within normal limits.    IMPRESSION:  Negative for pulmonary emboli.  Extensive bilateral multifocal pneumonia and bronchiolitis.  Prominent main pulmonary artery may reflect pulmonary hypertension  Mild mediastinal lymphadenopathy               27y/o M w/ PMH HIV presented to ED for SOB, cough, fever and sore throat x 5 days. Patient reports running out of his HIV medication (Biktarvy) 1 month ago and trying to change his ID doctor to a [place] location. In the ED, Pt received O2 supplementation due to hypoxia SpO2 85% on RA, and SpO2 improved 96%. On labs, Pt was RSV positive. Pt was admitted for acute respiratory failure 2/2 multifocal pneumonia with AIDS. Pt with CD4 count 119 requiring prophylactic antibiotics for PCP and MAC. Pt was evaluated by infectious disease, restarted on Biktarvy and started on Ceftriaxone, bactrim, diflucan and azithromycin. Pt will be discharged with diflucan 200mg bid for 7 days, Ceftin 500mg bid for 7days, Aziththromycin 600mg twice per week for 30days (MAC ppx) and Bactrim           CONSULT: Infectious Disease    IMAGING:	  Xray Chest 1 View- PORTABLE-Urgent (03.11.24 @ 12:42)   IMPRESSION: Moderate bibasilar infiltrates right greater than left at   this time.    CT Angio Chest PE Protocol w/ IV Cont (03.11.24 @ 12:52)   FINDINGS:  LUNGS AND AIRWAYS: Patent central airways.  Multifocal bilateral areas of   airspace consolidation and scattered bilateral tree-in-bud parenchymal   opacities consistent with multifocal bilateral pneumonia and   bronchiolitis..  PLEURA: No pleural effusion.  MEDIASTINUM AND FRANNY: Subcentimeter and minimally enlarged mediastinal   lymph nodes. Largest left paratracheal lymph node measures up to 2.3 x 1.4 cm.  VESSELS: No pulmonary emboli. Prominent main pulmonary artery may reflect   pulmonary hypertension..  HEART: Heart size is normal. No pericardial effusion.  CHEST WALL AND LOWER NECK: Within normal limits.  VISUALIZED UPPER ABDOMEN: Within normal limits.  BONES: Within normal limits.    IMPRESSION:  Negative for pulmonary emboli.  Extensive bilateral multifocal pneumonia and bronchiolitis.  Prominent main pulmonary artery may reflect pulmonary hypertension  Mild mediastinal lymphadenopathy

## 2024-03-13 NOTE — PROGRESS NOTE ADULT - SUBJECTIVE AND OBJECTIVE BOX
CC: f/u for AIDS    Patient reports that he feels a lot better. Was weaned off of O2 support. He is no longer coughing up the thick phlegm that he was coughing up. Appetite is okay. Denies diarrhea.     Date of service - 3/13/24.     REVIEW OF SYSTEMS:  All other review of systems negative (Comprehensive ROS) except as above     Antimicrobials Day #  :  bictegravir 50 mG/emtricitabine 200 mG/tenofovir alafenamide 25 mG (BIKTARVY) 1 Tablet(s) Oral daily  cefTRIAXone   IVPB 1000 milliGRAM(s) IV Intermittent every 24 hours  fluconAZOLE   Tablet 200 milliGRAM(s) Oral daily  trimethoprim  160 mG/sulfamethoxazole 800 mG 2 Tablet(s) Oral three times a day    Other Medications Reviewed    T(F): 97.7 (03-13-24 @ 13:17), Max: 97.7 (03-13-24 @ 13:17)  HR: 83 (03-13-24 @ 13:17)  BP: 112/77 (03-13-24 @ 13:17)  RR: 18 (03-13-24 @ 13:17)  SpO2: 96% (03-13-24 @ 13:17)  Wt(kg): --    PHYSICAL EXAM:  General: alert, no acute distress  Eyes:  anicteric, no conjunctival injection, no discharge  Neck: supple  Lungs: clear to auscultation  Heart: regular rate and rhythm; no murmurs  Abdomen: soft, nondistended, nontender, bowel sounds +  Skin: no lesions  Extremities: no edema  Neurologic: alert, oriented, moves all extremities    LAB RESULTS:                        9.6    9.02  )-----------( 409      ( 13 Mar 2024 05:13 )             28.8     03-13    134<L>  |  100  |  16  ----------------------------<  115<H>  4.8   |  26  |  1.28    Ca    9.3      13 Mar 2024 05:13  Phos  5.3     03-13  Mg     2.4     03-13    TPro  8.9<H>  /  Alb  1.8<L>  /  TBili  0.2  /  DBili  x   /  AST  67<H>  /  ALT  43  /  AlkPhos  96  03-13    LIVER FUNCTIONS - ( 13 Mar 2024 05:13 )  Alb: 1.8 g/dL / Pro: 8.9 g/dL / ALK PHOS: 96 U/L / ALT: 43 U/L / AST: 67 U/L / GGT: x           Urinalysis Basic - ( 13 Mar 2024 05:13 )    Color: x / Appearance: x / SG: x / pH: x  Gluc: 115 mg/dL / Ketone: x  / Bili: x / Urobili: x   Blood: x / Protein: x / Nitrite: x   Leuk Esterase: x / RBC: x / WBC x   Sq Epi: x / Non Sq Epi: x / Bacteria: x      MICROBIOLOGY:  RECENT CULTURES:  03-12 @ 14:23 .Sputum Sputum       No polymorphonuclear leukocytes per low power field  No Squamous epithelial cells per low power field  No organisms seen per oil power field    03-11 @ 13:09 .Blood Blood-Peripheral     No growth at 24 hours        RADIOLOGY REVIEWED:  < from: CT Angio Chest PE Protocol w/ IV Cont (03.11.24 @ 12:52) >  IMPRESSION:  Negative for pulmonary emboli.    Extensivebilateral multifocal pneumonia and bronchiolitis.    Prominent main pulmonary artery may reflect pulmonary hypertension    Mild mediastinal lymphadenopathy    --- End of Report ---    < end of copied text >  
Patient is a 29y/o Male [facility] HIV who presents with a chief complaint of SOB (11 Mar 2024 18:03)    Subjective: Patient was seen and examined this morning at bedside. Pt reports feeling better with improvement in SOB, and cough.   Overnight events: none    REVIEW OF SYSTEMS:  CONSTITUTIONAL: No weakness, fevers or chills  EYES/ENT: No visual changes;  No vertigo or throat pain   NECK: No pain or stiffness  RESPIRATORY: + cough, +SOB, no wheezing, no hemoptysis  CARDIOVASCULAR: No chest pain or palpitations  GASTROINTESTINAL: No abdominal or epigastric pain. No nausea, vomiting; No diarrhea or constipation.   GENITOURINARY: No dysuria, frequency or hematuria  NEUROLOGICAL: No numbness or weakness  SKIN: No itching, burning, rashes, or lesions       Vital Signs Last 24 Hrs  T(C): 36.8 (12 Mar 2024 12:45), Max: 36.9 (12 Mar 2024 05:02)  T(F): 98.2 (12 Mar 2024 12:45), Max: 98.5 (12 Mar 2024 05:02)  HR: 83 (12 Mar 2024 12:45) (82 - 84)  BP: 112/72 (12 Mar 2024 12:45) (104/64 - 124/86)  BP(mean): 87 (12 Mar 2024 02:26) (87 - 87)  RR: 18 (12 Mar 2024 12:45) (18 - 18)  SpO2: 96% (12 Mar 2024 12:45) (96% - 97%)    Parameters below as of 12 Mar 2024 12:45  Patient On (Oxygen Delivery Method): nasal cannula  O2 Flow (L/min): 3      PHYSICAL EXAM  Constitutional: Pt lying in bed, awake and alert, NAD  HEENT: EOMI, normocephalic, moist mucous membranes, +thrush  Neck: Soft and supple,   Respiratory: normal respiratory effort, +b/l crackles, no wheezing  Cardiovascular: S1S2+, RRR, no M/G/R  Gastrointestinal: BS+, soft, nontender, nondistended, no guarding, no rebound  Extremities: No calf pain or edema  Vascular: Peripheral pulses present  Neurological: AAOx3, no focal deficits  Musculoskeletal: Normal muscle tone, no atrophy, no rigidity, no contractions  Skin: No significant new skin lesions or rashes    MEDICATIONS:  MEDICATIONS  (STANDING):  azithromycin   Tablet 500 milliGRAM(s) Oral daily  bictegravir 50 mG/emtricitabine 200 mG/tenofovir alafenamide 25 mG (BIKTARVY) 1 Tablet(s) Oral daily  cefTRIAXone   IVPB 1000 milliGRAM(s) IV Intermittent every 24 hours  fluconAZOLE   Tablet 200 milliGRAM(s) Oral daily  influenza   Vaccine 0.5 milliLiter(s) IntraMuscular once  predniSONE   Tablet 40 milliGRAM(s) Oral daily  trimethoprim  160 mG/sulfamethoxazole 800 mG 2 Tablet(s) Oral three times a day    MEDICATIONS  (PRN):  acetaminophen     Tablet .. 650 milliGRAM(s) Oral every 6 hours PRN Temp greater or equal to 38C (100.4F), Mild Pain (1 - 3)  ondansetron Injectable 4 milliGRAM(s) IV Push every 8 hours PRN Nausea and/or Vomiting      LABS: All Labs Reviewed:                        8.9    6.38  )-----------( 342      ( 12 Mar 2024 05:44 )             27.5     03-12    133<L>  |  109<H>  |  12  ----------------------------<  89  4.4   |  28  |  1.13    Ca    8.6      12 Mar 2024 05:44    TPro  8.3  /  Alb  1.5<L>  /  TBili  0.3  /  DBili  x   /  AST  57<H>  /  ALT  33  /  AlkPhos  82  03-12          Blood Culture: 03-11 @ 13:09  Organism --  Gram Stain Blood -- Gram Stain --  Specimen Source .Blood Blood-Peripheral  Culture-Blood --        RADIOLOGY/EKG:     Xray Chest 1 View- PORTABLE-Urgent (03.11.24 @ 12:42)   IMPRESSION: Moderate bibasilar infiltrates right greater than left at   this time.     CT Angio Chest PE Protocol w/ IV Cont (03.11.24 @ 12:52)   FINDINGS:    LUNGSAND AIRWAYS: Patent central airways.  Multifocal bilateral areas of   airspace consolidation and scattered bilateral tree-in-bud parenchymal   opacities consistent with multifocal bilateral pneumonia and   bronchiolitis..  PLEURA: No pleural effusion.  MEDIASTINUM AND FRANNY: Subcentimeter and minimally enlarged mediastinal   lymph nodes. Largest left paratracheal lymph node measures up to 2.3 x   1.4 cm.  VESSELS: No pulmonary emboli. Prominent main pulmonary artery may reflect   pulmonary hypertension..  HEART: Heart size is normal. No pericardial effusion.  CHEST WALL AND LOWER NECK: Within normal limits.  VISUALIZED UPPER ABDOMEN: Within normal limits.  BONES: Within normal limits.    IMPRESSION:  Negative for pulmonary emboli.  Extensivebilateral multifocal pneumonia and bronchiolitis.  Prominent main pulmonary artery may reflect pulmonary hypertension  Mild mediastinal lymphadenopathy      
Patient is a 29y/o Male [facility] HIV who presents with a chief complaint of SOB (11 Mar 2024 18:03)    Subjective: Patient was seen and examined this morning at bedside. Pt reports feeling better with improvement in cough and no longer having SOB. Has tolerated room air for the past day SpO2 96%  Overnight events: none    REVIEW OF SYSTEMS:  CONSTITUTIONAL: No weakness, fevers or chills  EYES/ENT: No visual changes;  No vertigo or throat pain   NECK: No pain or stiffness  RESPIRATORY: + cough, no SOB, no wheezing, no hemoptysis  CARDIOVASCULAR: No chest pain or palpitations  GASTROINTESTINAL: No abdominal or epigastric pain. No nausea, vomiting; No diarrhea or constipation.   GENITOURINARY: No dysuria, frequency or hematuria  NEUROLOGICAL: No numbness or weakness  SKIN: No itching, burning, rashes, or lesions       Vital Signs Last 24 Hrs  T(C): 36.5 (13 Mar 2024 13:17), Max: 36.5 (13 Mar 2024 13:17)  T(F): 97.7 (13 Mar 2024 13:17), Max: 97.7 (13 Mar 2024 13:17)  HR: 83 (13 Mar 2024 13:17) (74 - 91)  BP: 112/77 (13 Mar 2024 13:17) (107/62 - 112/77)  BP(mean): --  RR: 18 (13 Mar 2024 13:17) (18 - 18)  SpO2: 96% (13 Mar 2024 13:17) (93% - 96%)    Parameters below as of 13 Mar 2024 13:17  Patient On (Oxygen Delivery Method): room air      PHYSICAL EXAM  Constitutional: Pt lying in bed, awake and alert, NAD  HEENT: EOMI, normocephalic, moist mucous membranes, +thrush  Neck: Soft and supple,   Respiratory: normal respiratory effort, CTA x2, no wheezing  Cardiovascular: S1S2+, RRR, no M/G/R  Gastrointestinal: BS+, soft, nontender, nondistended, no guarding, no rebound  Extremities: No calf pain or edema  Vascular: Peripheral pulses present  Neurological: AAOx3, no focal deficits  Musculoskeletal: Normal muscle tone, no atrophy, no rigidity, no contractions  Skin: No significant new skin lesions or rashes    MEDICATIONS:  MEDICATIONS  (STANDING):  bictegravir 50 mG/emtricitabine 200 mG/tenofovir alafenamide 25 mG (BIKTARVY) 1 Tablet(s) Oral daily  cefTRIAXone   IVPB 1000 milliGRAM(s) IV Intermittent every 24 hours  fluconAZOLE   Tablet 200 milliGRAM(s) Oral daily  influenza   Vaccine 0.5 milliLiter(s) IntraMuscular once  trimethoprim  160 mG/sulfamethoxazole 800 mG 2 Tablet(s) Oral three times a day    MEDICATIONS  (PRN):  acetaminophen     Tablet .. 650 milliGRAM(s) Oral every 6 hours PRN Temp greater or equal to 38C (100.4F), Mild Pain (1 - 3)  ondansetron Injectable 4 milliGRAM(s) IV Push every 8 hours PRN Nausea and/or Vomiting      LABS: All Labs Reviewed:                           9.6    9.02  )-----------( 409      ( 13 Mar 2024 05:13 )             28.8     03-13    134<L>  |  100  |  16  ----------------------------<  115<H>  4.8   |  26  |  1.28    Ca    9.3      13 Mar 2024 05:13  Phos  5.3     03-13  Mg     2.4     03-13    TPro  8.9<H>  /  Alb  1.8<L>  /  TBili  0.2  /  DBili  x   /  AST  67<H>  /  ALT  43  /  AlkPhos  96  03-13    LIVER FUNCTIONS - ( 13 Mar 2024 05:13 )  Alb: 1.8 g/dL / Pro: 8.9 g/dL / ALK PHOS: 96 U/L / ALT: 43 U/L / AST: 67 U/L / GGT: x               RADIOLOGY/EKG:     Xray Chest 1 View- PORTABLE-Urgent (03.11.24 @ 12:42)   IMPRESSION: Moderate bibasilar infiltrates right greater than left at   this time.     CT Angio Chest PE Protocol w/ IV Cont (03.11.24 @ 12:52)   FINDINGS:    LUNGSAND AIRWAYS: Patent central airways.  Multifocal bilateral areas of   airspace consolidation and scattered bilateral tree-in-bud parenchymal   opacities consistent with multifocal bilateral pneumonia and   bronchiolitis..  PLEURA: No pleural effusion.  MEDIASTINUM AND FRANNY: Subcentimeter and minimally enlarged mediastinal   lymph nodes. Largest left paratracheal lymph node measures up to 2.3 x   1.4 cm.  VESSELS: No pulmonary emboli. Prominent main pulmonary artery may reflect   pulmonary hypertension..  HEART: Heart size is normal. No pericardial effusion.  CHEST WALL AND LOWER NECK: Within normal limits.  VISUALIZED UPPER ABDOMEN: Within normal limits.  BONES: Within normal limits.    IMPRESSION:  Negative for pulmonary emboli.  Extensivebilateral multifocal pneumonia and bronchiolitis.  Prominent main pulmonary artery may reflect pulmonary hypertension  Mild mediastinal lymphadenopathy

## 2024-03-13 NOTE — DISCHARGE NOTE PROVIDER - NSDCMRMEDTOKEN_GEN_ALL_CORE_FT
Biktarvy 50 mg-200 mg-25 mg oral tablet: 1 tab(s) orally once a day   azithromycin 600 mg oral tablet: 1 tab(s) orally 2 times a week  Biktarvy 50 mg-200 mg-25 mg oral tablet: 1 tab(s) orally once a day  cefuroxime 500 mg oral tablet: 1 tab(s) orally every 12 hours  fluconazole 200 mg oral tablet: 1 tab(s) orally once a day  sulfamethoxazole-trimethoprim 800 mg-160 mg oral tablet: 1 tab(s) orally 3 times a day

## 2024-03-13 NOTE — PROGRESS NOTE ADULT - ASSESSMENT
29y/o Male [facility] HIV who presents with a chief complaint of SOB      #Acute hypoxic respiratory failure  -saturating 85% on RA, placed on 4 L NC with improvement to 96% on admission  -suspect due to PNA -possible CAP and less likely PCP  -positive RSV  -hypoxia resolved  - tolerating room air    #PNA, suspected CAP vs PCP  -CTA chest shows extensive bilateral multifocal pneumonia and bronchiolitis with mild mediastinal lymphadenopathy  -c/w bactrim  -change CTX to Ceftin 500mg PO bid x7 days  -ID consult appreciated    #AIDS  -h/o HIV  -CD4 count 118  -c/w biktarvy  -starting azithromycin 600mg twice a week for MAC ppx  -f/u viral load  -ID consult appreciated  -compliance with medications discussed    #Oropharyngeal candida  -c/w diflucan    #DVT ppx  low risk    Offered to speak to family however patient declined   Patient does not want his HIV diagnosis to be discussed with family    Discussed with Dr. Franklin   29y/o Male [facility] HIV who presents with a chief complaint of SOB      #Acute hypoxic respiratory failure  -saturating 85% on RA, placed on 4 L NC with improvement to 96% on admission  -suspect due to PNA -possible CAP and less likely PCP  -positive RSV  -hypoxia resolved  - tolerating room air    #PNA, suspected CAP vs PCP  -CTA chest shows extensive bilateral multifocal pneumonia and bronchiolitis with mild mediastinal lymphadenopathy  -c/w bactrim  -change CTX to Ceftin 500mg PO bid x7 days  -ID consult appreciated    #AIDS  -h/o HIV  -CD4 count 118  -c/w biktarvy  -starting azithromycin 600mg twice a week for MAC ppx  -f/u viral load  -ID consult appreciated  -compliance with medications discussed    #Oropharyngeal candida  -c/w diflucan    #DVT ppx  low risk, SCDs    Dispo: medically improved, dc home with f/u PCP and ID    Offered to speak to family however patient declined   Patient does not want his HIV diagnosis to be discussed with family    Discussed with Dr. Franklin

## 2024-03-13 NOTE — DISCHARGE NOTE PROVIDER - CARE PROVIDER_API CALL
Thomas Collins  Internal Medicine  [address] [place] [address]  Phone: [phone]  Fax: ()-  Established Patient  Follow Up Time:    Thomas Collins  Internal Medicine  [address] [place] [address]  Phone: [phone]  Fax: ()-  Established Patient  Follow Up Time:     Devin Encarnacion  Infectious Disease  [address] [address]  Phone: [phone]  Fax: [phone]  Follow Up Time:     Milton Palomo  Infectious Disease  [address] [address]  Phone: [phone]  Fax: [phone]  Follow Up Time:

## 2024-03-13 NOTE — PROGRESS NOTE ADULT - ATTENDING COMMENTS
28 y old male with past medical history of HIV, not on ART , admitted with shortness of breath secondary to RSV infection and likely superimposed bacterial pneumonia. responded well to antibiotics. Now off supplemental oxygen.    T(C): 36.5 (03-13-24 @ 13:17), Max: 36.5 (03-13-24 @ 13:17)  T(F): 97.7 (03-13-24 @ 13:17), Max: 97.7 (03-13-24 @ 13:17)  HR: 83 (03-13-24 @ 13:17) (74 - 91)  BP: 112/77 (03-13-24 @ 13:17) (107/62 - 112/77)  ABP: --  ABP(mean): --  RR: 18 (03-13-24 @ 13:17) (18 - 18)  SpO2: 96% (03-13-24 @ 13:17) (93% - 96%)    on exam aaox3, no apparent distress, able to talk in full sentences,lungs with crackles at basis, s1, s2, regular, abdomen- soft, no pedal edema.    labs reviewed                              9.6    9.02  )-----------( 409      ( 13 Mar 2024 05:13 )             28.8   03-13    134<L>  |  100  |  16  ----------------------------<  115<H>  4.8   |  26  |  1.28    Ca    9.3      13 Mar 2024 05:13  Phos  5.3     03-13  Mg     2.4     03-13    TPro  8.9<H>  /  Alb  1.8<L>  /  TBili  0.2  /  DBili  x   /  AST  67<H>  /  ALT  43  /  AlkPhos  96  03-13       RSV positive  cd4- 119  chest xray reviewed by me  legionella - negative    a/p:  #  RSV infection and superimposed bacterial pneumonia  # Underlying h/o HIV, not on treatment.  # Hyponatremia- improving  # Hypoalbumenemia- nutritional supplements, ensure.  - had been off oxygen since yesterday. can transition to oral antibiotics, amoxicillin and azithromycin. PSI class 1. continue bactrim for prophylaxis. continue biktarvy. low suspicion for pjp, needs prophylaxis due to low cd4 count. diflucan. outpatient follow up with our HIV clinic, Dr. Encarnacion or Dr. Palomo  - brenda as per resident note  - disch disopo- medically stable for discharge.    I spent a total of 35 minutes on the date of this encounter coordinating the patient's care. This includes reviewing results/imaging and discussions with specialists, nursing, case management/social work. Further tests, medications, and procedures have been ordered as indicated. Results and the plan of care were communicated to the patient and/or their family member. Supporting documentation was completed and added to the patient's chart.
28 y old male with past medical history of HIV, not on ART , admitted with acute respiratory failure secondary to RSV infection and likely superimposed bacterial pneumonia. responded well to antibiotics.   T(C): 36.8 (03-12-24 @ 12:45), Max: 36.9 (03-12-24 @ 05:02)  T(F): 98.2 (03-12-24 @ 12:45), Max: 98.5 (03-12-24 @ 05:02)  HR: 83 (03-12-24 @ 12:45) (83 - 84)  BP: 112/72 (03-12-24 @ 12:45) (104/64 - 112/72)  ABP: --  ABP(mean): --  RR: 18 (03-12-24 @ 12:45) (18 - 18)  SpO2: 96% (03-12-24 @ 12:45) (96% - 97%)    on exam aaox3, no apparent distress, able to talk in full sentences,lungs with crackles at basis, s1, s2, regular, abdomen- soft, no pedal edema.    labs reviewed                        8.9    6.38  )-----------( 342      ( 12 Mar 2024 05:44 )             27.5   03-12    133<L>  |  109<H>  |  12  ----------------------------<  89  4.4   |  28  |  1.13    Ca    8.6      12 Mar 2024 05:44    TPro  8.3  /  Alb  1.5<L>  /  TBili  0.3  /  DBili  x   /  AST  57<H>  /  ALT  33  /  AlkPhos  82  03-12    RSV positive  cd4- 119  chest xray reviewed by me    a/p:  # Acute hypoxemic respiratory failure requiring supplemental oxygen secondary to RSV infection and superimposed bacterial pneumonia  # Underlying h/o HIV, not on treatment.  - started on ceftriaxone, zithromax, supplemental o2 as needed. low suspicion for pjp, needs prophylaxis due to low cd4 count. check cryptococcal antigen. started on biktarvy.  - rest as per resident note  - disch disopo- medically active, 24-48 hrs.    I spent a total of 55 minutes on the date of this encounter coordinating the patient's care. This includes reviewing results/imaging and discussions with specialists, nursing, case management/social work. Further tests, medications, and procedures have been ordered as indicated. Results and the plan of care were communicated to the patient and/or their family member. Supporting documentation was completed and added to the patient's chart.

## 2024-03-14 LAB
CULTURE RESULTS: SIGNIFICANT CHANGE UP
SPECIMEN SOURCE: SIGNIFICANT CHANGE UP

## 2024-03-16 LAB
CULTURE RESULTS: SIGNIFICANT CHANGE UP
CULTURE RESULTS: SIGNIFICANT CHANGE UP
SPECIMEN SOURCE: SIGNIFICANT CHANGE UP
SPECIMEN SOURCE: SIGNIFICANT CHANGE UP

## 2024-04-24 LAB
CULTURE RESULTS: SIGNIFICANT CHANGE UP
SPECIMEN SOURCE: SIGNIFICANT CHANGE UP

## 2024-06-04 NOTE — ASU PREOP CHECKLIST - ANTIBIOTIC
Pt was left a confidential voicemail instructing her to go onto her portal to see Dr Caldwell response and orders   
n/a